# Patient Record
Sex: MALE | Race: WHITE | NOT HISPANIC OR LATINO
[De-identification: names, ages, dates, MRNs, and addresses within clinical notes are randomized per-mention and may not be internally consistent; named-entity substitution may affect disease eponyms.]

---

## 2017-01-19 ENCOUNTER — APPOINTMENT (OUTPATIENT)
Dept: ELECTROPHYSIOLOGY | Facility: CLINIC | Age: 77
End: 2017-01-19

## 2017-04-20 ENCOUNTER — APPOINTMENT (OUTPATIENT)
Dept: ELECTROPHYSIOLOGY | Facility: CLINIC | Age: 77
End: 2017-04-20

## 2017-04-20 ENCOUNTER — APPOINTMENT (OUTPATIENT)
Dept: CARDIOLOGY | Facility: CLINIC | Age: 77
End: 2017-04-20

## 2017-04-20 ENCOUNTER — NON-APPOINTMENT (OUTPATIENT)
Age: 77
End: 2017-04-20

## 2017-04-20 VITALS
HEIGHT: 72 IN | BODY MASS INDEX: 24.24 KG/M2 | DIASTOLIC BLOOD PRESSURE: 64 MMHG | WEIGHT: 179 LBS | SYSTOLIC BLOOD PRESSURE: 99 MMHG | HEART RATE: 49 BPM | OXYGEN SATURATION: 97 %

## 2017-04-21 ENCOUNTER — RESULT REVIEW (OUTPATIENT)
Age: 77
End: 2017-04-21

## 2017-04-21 LAB
ANION GAP SERPL CALC-SCNC: 18 MMOL/L
BASOPHILS # BLD AUTO: 0.08 K/UL
BASOPHILS NFR BLD AUTO: 1.5 %
BUN SERPL-MCNC: 28 MG/DL
CALCIUM SERPL-MCNC: 10 MG/DL
CHLORIDE SERPL-SCNC: 99 MMOL/L
CO2 SERPL-SCNC: 21 MMOL/L
CREAT SERPL-MCNC: 1.68 MG/DL
EOSINOPHIL # BLD AUTO: 0.32 K/UL
EOSINOPHIL NFR BLD AUTO: 5.9 %
GLUCOSE SERPL-MCNC: 65 MG/DL
HCT VFR BLD CALC: 41.3 %
HGB BLD-MCNC: 13.4 G/DL
IMM GRANULOCYTES NFR BLD AUTO: 0.2 %
LYMPHOCYTES # BLD AUTO: 1.13 K/UL
LYMPHOCYTES NFR BLD AUTO: 20.9 %
MAN DIFF?: NORMAL
MCHC RBC-ENTMCNC: 28.7 PG
MCHC RBC-ENTMCNC: 32.4 GM/DL
MCV RBC AUTO: 88.4 FL
MONOCYTES # BLD AUTO: 0.52 K/UL
MONOCYTES NFR BLD AUTO: 9.6 %
NEUTROPHILS # BLD AUTO: 3.35 K/UL
NEUTROPHILS NFR BLD AUTO: 61.9 %
NT-PROBNP SERPL-MCNC: 573 PG/ML
PLATELET # BLD AUTO: 175 K/UL
POTASSIUM SERPL-SCNC: 4.3 MMOL/L
RBC # BLD: 4.67 M/UL
RBC # FLD: 14.5 %
SODIUM SERPL-SCNC: 138 MMOL/L
WBC # FLD AUTO: 5.41 K/UL

## 2017-08-03 ENCOUNTER — APPOINTMENT (OUTPATIENT)
Dept: ELECTROPHYSIOLOGY | Facility: CLINIC | Age: 77
End: 2017-08-03
Payer: MEDICARE

## 2017-08-03 PROCEDURE — 93296 REM INTERROG EVL PM/IDS: CPT

## 2017-08-03 PROCEDURE — 93295 DEV INTERROG REMOTE 1/2/MLT: CPT

## 2017-10-09 ENCOUNTER — OUTPATIENT (OUTPATIENT)
Dept: OUTPATIENT SERVICES | Facility: HOSPITAL | Age: 77
LOS: 1 days | End: 2017-10-09
Payer: MEDICARE

## 2017-10-09 DIAGNOSIS — Z12.11 ENCOUNTER FOR SCREENING FOR MALIGNANT NEOPLASM OF COLON: ICD-10-CM

## 2017-10-09 PROCEDURE — G0121: CPT

## 2017-11-09 ENCOUNTER — NON-APPOINTMENT (OUTPATIENT)
Age: 77
End: 2017-11-09

## 2017-11-09 ENCOUNTER — APPOINTMENT (OUTPATIENT)
Dept: ELECTROPHYSIOLOGY | Facility: CLINIC | Age: 77
End: 2017-11-09
Payer: MEDICARE

## 2017-11-09 ENCOUNTER — APPOINTMENT (OUTPATIENT)
Dept: CARDIOLOGY | Facility: CLINIC | Age: 77
End: 2017-11-09
Payer: MEDICARE

## 2017-11-09 VITALS
DIASTOLIC BLOOD PRESSURE: 73 MMHG | HEART RATE: 52 BPM | BODY MASS INDEX: 23.16 KG/M2 | OXYGEN SATURATION: 99 % | HEIGHT: 72 IN | SYSTOLIC BLOOD PRESSURE: 120 MMHG | WEIGHT: 171 LBS | RESPIRATION RATE: 14 BRPM

## 2017-11-09 PROCEDURE — 99214 OFFICE O/P EST MOD 30 MIN: CPT

## 2017-11-09 PROCEDURE — 93000 ELECTROCARDIOGRAM COMPLETE: CPT

## 2017-11-09 PROCEDURE — 93284 PRGRMG EVAL IMPLANTABLE DFB: CPT

## 2017-11-10 LAB
ANION GAP SERPL CALC-SCNC: 14 MMOL/L
BUN SERPL-MCNC: 33 MG/DL
CALCIUM SERPL-MCNC: 10.4 MG/DL
CHLORIDE SERPL-SCNC: 102 MMOL/L
CO2 SERPL-SCNC: 22 MMOL/L
CREAT SERPL-MCNC: 1.68 MG/DL
GLUCOSE SERPL-MCNC: 74 MG/DL
NT-PROBNP SERPL-MCNC: 702 PG/ML
POTASSIUM SERPL-SCNC: 4.8 MMOL/L
SODIUM SERPL-SCNC: 138 MMOL/L

## 2018-02-15 ENCOUNTER — APPOINTMENT (OUTPATIENT)
Dept: ELECTROPHYSIOLOGY | Facility: CLINIC | Age: 78
End: 2018-02-15

## 2018-05-10 ENCOUNTER — APPOINTMENT (OUTPATIENT)
Dept: ELECTROPHYSIOLOGY | Facility: CLINIC | Age: 78
End: 2018-05-10
Payer: MEDICARE

## 2018-05-10 ENCOUNTER — APPOINTMENT (OUTPATIENT)
Dept: CARDIOLOGY | Facility: CLINIC | Age: 78
End: 2018-05-10
Payer: MEDICARE

## 2018-05-10 VITALS
SYSTOLIC BLOOD PRESSURE: 109 MMHG | HEIGHT: 72 IN | OXYGEN SATURATION: 10 % | HEART RATE: 50 BPM | WEIGHT: 171 LBS | BODY MASS INDEX: 23.16 KG/M2 | DIASTOLIC BLOOD PRESSURE: 69 MMHG

## 2018-05-10 PROCEDURE — 93284 PRGRMG EVAL IMPLANTABLE DFB: CPT

## 2018-05-10 PROCEDURE — 99214 OFFICE O/P EST MOD 30 MIN: CPT

## 2018-05-10 PROCEDURE — 93000 ELECTROCARDIOGRAM COMPLETE: CPT

## 2018-05-10 PROCEDURE — 36415 COLL VENOUS BLD VENIPUNCTURE: CPT

## 2018-05-10 RX ORDER — TADALAFIL 5 MG/1
5 TABLET, FILM COATED ORAL
Qty: 6 | Refills: 0 | Status: ACTIVE | COMMUNITY
Start: 2018-02-19

## 2018-05-11 LAB
ALBUMIN SERPL ELPH-MCNC: 4.3 G/DL
ALP BLD-CCNC: 72 U/L
ALT SERPL-CCNC: 25 U/L
ANION GAP SERPL CALC-SCNC: 16 MMOL/L
AST SERPL-CCNC: 36 U/L
BILIRUB SERPL-MCNC: 0.7 MG/DL
BUN SERPL-MCNC: 29 MG/DL
CALCIUM SERPL-MCNC: 10.1 MG/DL
CHLORIDE SERPL-SCNC: 101 MMOL/L
CO2 SERPL-SCNC: 23 MMOL/L
CREAT SERPL-MCNC: 1.51 MG/DL
GLUCOSE SERPL-MCNC: 52 MG/DL
NT-PROBNP SERPL-MCNC: 591 PG/ML
POTASSIUM SERPL-SCNC: 3.9 MMOL/L
PROT SERPL-MCNC: 7.2 G/DL
SODIUM SERPL-SCNC: 140 MMOL/L

## 2018-07-31 ENCOUNTER — EMERGENCY (EMERGENCY)
Facility: HOSPITAL | Age: 78
LOS: 1 days | Discharge: HOME | End: 2018-07-31
Attending: EMERGENCY MEDICINE
Payer: MEDICARE

## 2018-07-31 VITALS
RESPIRATION RATE: 18 BRPM | HEART RATE: 54 BPM | SYSTOLIC BLOOD PRESSURE: 127 MMHG | OXYGEN SATURATION: 100 % | HEIGHT: 72 IN | DIASTOLIC BLOOD PRESSURE: 65 MMHG | TEMPERATURE: 98 F | WEIGHT: 169.09 LBS

## 2018-07-31 VITALS
OXYGEN SATURATION: 100 % | TEMPERATURE: 98 F | SYSTOLIC BLOOD PRESSURE: 132 MMHG | HEART RATE: 48 BPM | RESPIRATION RATE: 16 BRPM | DIASTOLIC BLOOD PRESSURE: 75 MMHG

## 2018-07-31 PROCEDURE — 70450 CT HEAD/BRAIN W/O DYE: CPT | Mod: 26

## 2018-07-31 PROCEDURE — 99284 EMERGENCY DEPT VISIT MOD MDM: CPT | Mod: GC

## 2018-07-31 PROCEDURE — 70450 CT HEAD/BRAIN W/O DYE: CPT

## 2018-07-31 PROCEDURE — 99284 EMERGENCY DEPT VISIT MOD MDM: CPT | Mod: 25

## 2018-07-31 NOTE — ED SUB INTERN NOTE - OBJECTIVE STATEMENT FT
Pt is a 77 yom, with a past medical history of CVA, CHF, HLD. Presenting with a chief complaint of head pain after having hitting his head on his car door while trying to open it this evening. Pt is on ac-ed coumadin, and became concerned after the incident. Denies any current headache, nausea, vomiting, dizziness, or changes to vision. ROS otherwise unremarkable. Pt is not in any pain currently, comfortable in no acute distress.

## 2018-07-31 NOTE — ED ADULT NURSE NOTE - OBJECTIVE STATEMENT
78y/o Female presented to the ED from home with complaint of head injury. A&Ox3, ambulatory. Patient states that he opened the car door and hit himself in the head with the corner of the car door. On coumadin, Hx stroke, CHF, defibrillator placement 2010. States incident happened around 7:30 tonight. Denies LOC. Patient was seen at urgent care and was told to go to ED for further evaluation. Small abrasion noted over right eyebrow. No bruising /deformity noted. Denies headache, dizziness, N/V/D, chest pain, palpitations. Lung sounds equal/ clear bilaterally. Cap refill < 2 sec. +2 peripheral pulses.

## 2018-07-31 NOTE — ED PROVIDER NOTE - ATTENDING CONTRIBUTION TO CARE
attending Brian: 77yM h/o prior CVA, HLD, CAD on coumadin, presents with headache after minor head trauma on car door. Denies LOC, nausea/vomiting, neck pain, vision changes, confusion, fall. On exam, nonfocal, well-appearing. Will obtain head CT given head trauma on coumadin and reassess

## 2018-07-31 NOTE — ED ADULT TRIAGE NOTE - CHIEF COMPLAINT QUOTE
hit right forehead in a car door 1930 tonight. (+) light redness. no bruise noted. ON coumadin. Denies pain. no loc

## 2018-07-31 NOTE — ED SUB INTERN NOTE - PMH
AICD at End of Battery Life    CAD (Coronary Artery Disease)    Cardiac Aneurysm  Apical  CHF (Congestive Heart Failure)    Coronary Stent  LAD  CVA (Cerebral Infarction)    Ischemic Cardiomyopathy

## 2018-07-31 NOTE — ED PROVIDER NOTE - OBJECTIVE STATEMENT
77 M h/o CVA, HLD, CAD on coumadin, p/w head pain. Tried to open car door earlier this evening and car door hit his head. No headache currently. No LOC. No nausea. No vomiting. No vision change. No neck pain.

## 2018-07-31 NOTE — ED SUB INTERN NOTE - PHYSICAL EXAMINATION
Gen: AO X3, in no acute distress  Head: 1 cm lesion, minimally erythematous, overlying skin intact c/w pt's chief complaint  Eyes: PERRLA  Heart: Normal S1, S2, RRR, No MRG  Lungs: CTA B/L, No RRW  Abd: NT/ND  Neuro: CN 2-12 Grossly Intact  MSK: Full ROM in Upper and Lower Extremities, 5/5 Muscle Strength Upper and Lower Extremities    Assessment:  - R/O Cranial Bleed (Epidural)    Plan:  - CT Head  - Pain Control should patient develop pain

## 2018-07-31 NOTE — ED PROVIDER NOTE - PROGRESS NOTE DETAILS
No e/o acute hemorrhage. patient feeling well with no headache or neuro complaints. Ambulating well. Home with pmd follow up.

## 2018-07-31 NOTE — ED PROVIDER NOTE - MEDICAL DECISION MAKING DETAILS
77 M with head trauma. Low mechanism and no LOC and normal neuro exam. However, given on coumadin will obtain CT head.

## 2018-07-31 NOTE — ED ADULT NURSE NOTE - NSIMPLEMENTINTERV_GEN_ALL_ED
Implemented All Fall with Harm Risk Interventions:  [place] to call system. Call bell, personal items and telephone within reach. Instruct patient to call for assistance. Room bathroom lighting operational. Non-slip footwear when patient is off stretcher. Physically safe environment: no spills, clutter or unnecessary equipment. Stretcher in lowest position, wheels locked, appropriate side rails in place. Provide visual cue, wrist band, yellow gown, etc. Monitor gait and stability. Monitor for mental status changes and reorient to person, place, and time. Review medications for side effects contributing to fall risk. Reinforce activity limits and safety measures with patient and family. Provide visual clues: red socks.

## 2018-08-30 ENCOUNTER — APPOINTMENT (OUTPATIENT)
Dept: ELECTROPHYSIOLOGY | Facility: CLINIC | Age: 78
End: 2018-08-30

## 2018-11-08 ENCOUNTER — OUTPATIENT (OUTPATIENT)
Dept: OUTPATIENT SERVICES | Facility: HOSPITAL | Age: 78
LOS: 1 days | End: 2018-11-08

## 2018-11-08 ENCOUNTER — OTHER (OUTPATIENT)
Age: 78
End: 2018-11-08

## 2018-11-08 ENCOUNTER — APPOINTMENT (OUTPATIENT)
Dept: CV DIAGNOSITCS | Facility: HOSPITAL | Age: 78
End: 2018-11-08
Payer: MEDICARE

## 2018-11-08 ENCOUNTER — APPOINTMENT (OUTPATIENT)
Dept: CARDIOLOGY | Facility: CLINIC | Age: 78
End: 2018-11-08
Payer: MEDICARE

## 2018-11-08 ENCOUNTER — NON-APPOINTMENT (OUTPATIENT)
Age: 78
End: 2018-11-08

## 2018-11-08 ENCOUNTER — APPOINTMENT (OUTPATIENT)
Dept: ELECTROPHYSIOLOGY | Facility: CLINIC | Age: 78
End: 2018-11-08
Payer: MEDICARE

## 2018-11-08 VITALS
SYSTOLIC BLOOD PRESSURE: 118 MMHG | HEART RATE: 53 BPM | HEIGHT: 72 IN | WEIGHT: 176 LBS | BODY MASS INDEX: 23.84 KG/M2 | OXYGEN SATURATION: 100 % | DIASTOLIC BLOOD PRESSURE: 76 MMHG

## 2018-11-08 VITALS — RESPIRATION RATE: 14 BRPM | DIASTOLIC BLOOD PRESSURE: 79 MMHG | HEART RATE: 50 BPM | SYSTOLIC BLOOD PRESSURE: 122 MMHG

## 2018-11-08 DIAGNOSIS — I50.22 CHRONIC SYSTOLIC (CONGESTIVE) HEART FAILURE: ICD-10-CM

## 2018-11-08 PROCEDURE — 93306 TTE W/DOPPLER COMPLETE: CPT | Mod: 26

## 2018-11-08 PROCEDURE — 93000 ELECTROCARDIOGRAM COMPLETE: CPT

## 2018-11-08 PROCEDURE — 93284 PRGRMG EVAL IMPLANTABLE DFB: CPT

## 2018-11-08 PROCEDURE — 99214 OFFICE O/P EST MOD 30 MIN: CPT

## 2018-11-08 NOTE — REASON FOR VISIT
[Follow-Up - Clinic] : a clinic follow-up of [Heart Failure] : congestive heart failure [Spouse] : spouse [FreeTextEntry1] : Cardiomyopathy

## 2018-11-08 NOTE — CARDIOLOGY SUMMARY
[___] : [unfilled] [LVEF ___%] : LVEF [unfilled]% [Severe] : severe LV dysfunction [Normal] : normal LA size [None] : no mitral regurgitation

## 2018-11-08 NOTE — ADDENDUM
[FreeTextEntry1] : Called with lab results from 5/10; Na 140 K 3.9 BUN/creat 29/1.51 and pro BNP decreased to 501. Will mail a copy to pt and will send a copy to primary. Of note, lab glucose error 52.

## 2018-11-08 NOTE — HISTORY OF PRESENT ILLNESS
[FreeTextEntry1] : 78 year old man with history of coronary artery disease, s/p PCI with resultant ischemic cardiomyopathy, HFrEF LVEF 25-30% , S/P BiV ICD 11/26/2010 and CKD.\par \par Comes for a follow up visit today and device check. He is here with his wife. Feels great- no acute SOB, orthopnea, PND, CP, palpitations, dizziness ,syncope or ICd firing. He said he can walk an unlimited distance w/o dyspnea. He walks with his wife over an hour per day. He can climb stairs. He sleeps with one pillow. Pt's B/P today is 118/76 and his ramipril was previously decreased to 2.5 mg daily from 5 mg daily due to low B/P.   Device check and is set at DDD  with good BiV pacing. Of note, pt had a colonoscopy 2017 and reports normal findings. Pt had a TTE 1/7/14 with LVEF 25-30% and repeat TTE today with no significant change with LVEF 26%..

## 2018-11-08 NOTE — DISCUSSION/SUMMARY
[Patient] : the patient [___ Month(s)] : [unfilled] month(s) [FreeTextEntry2] : spouse [FreeTextEntry1] : 1. Chronic systolic HF- NYHA class I symptoms. Appears euvolemic and is normotensive \par - continue ramipril to 2.5 mg daily from 5 mg secondary to prior low B/P episodes\par -Continue with GDMT. Dosages not uptitrated due to hypotension at home but would like to try to increase ramipril to 7.5 mg if B/P stays above 100 mmHg and if creat is < 1.7.\par - TTE today with LVEf 26% with no significant change from 1/17/14\par \par 2. CAD S/P PCI- no active chest pain, continue Coreg at current dose\par \par 3. CKD- creat 5/10/18 was 1.5\par \par -Will fax echo to Jeniffer Cordova [phone] PMD.\par \par Follow up in office in 6 months with device check

## 2018-11-08 NOTE — ASSESSMENT
[FreeTextEntry1] : 78 year old man with history of coronary artery disease, s/p PCI with resultant ischemic cardiomyopathy, HFrEF LVEF 25-30%, S/P BiV ICD and CKD. ACC/AHA Stage C, NYHA Class I\par Appears euvolemic and normotensive

## 2018-11-08 NOTE — REVIEW OF SYSTEMS
[Negative] : Heme/Lymph [see HPI] : see HPI [Recent Weight Gain (___ Lbs)] : recent [unfilled] ~Ulb weight gain [Recent Weight Loss (___ Lbs)] : no recent weight loss

## 2018-11-08 NOTE — PHYSICAL EXAM
[General Appearance - Well Developed] : well developed [Normal Appearance] : normal appearance [Well Groomed] : well groomed [General Appearance - Well Nourished] : well nourished [No Deformities] : no deformities [General Appearance - In No Acute Distress] : no acute distress [Normal Conjunctiva] : the conjunctiva exhibited no abnormalities [Eyelids - No Xanthelasma] : the eyelids demonstrated no xanthelasmas [Normal Oral Mucosa] : normal oral mucosa [No Oral Pallor] : no oral pallor [No Oral Cyanosis] : no oral cyanosis [Normal Jugular Venous A Waves Present] : normal jugular venous A waves present [Normal Jugular Venous V Waves Present] : normal jugular venous V waves present [No Jugular Venous Jackson A Waves] : no jugular venous jackson A waves [Respiration, Rhythm And Depth] : normal respiratory rhythm and effort [Exaggerated Use Of Accessory Muscles For Inspiration] : no accessory muscle use [Auscultation Breath Sounds / Voice Sounds] : lungs were clear to auscultation bilaterally [Heart Rate And Rhythm] : heart rate and rhythm were normal [Heart Sounds] : normal S1 and S2 [Edema] : no peripheral edema present [Bowel Sounds] : normal bowel sounds [Abdomen Soft] : soft [Abdomen Tenderness] : non-tender [Abnormal Walk] : normal gait [Nail Clubbing] : no clubbing of the fingernails [Cyanosis, Localized] : no localized cyanosis [Petechial Hemorrhages (___cm)] : no petechial hemorrhages [Skin Color & Pigmentation] : normal skin color and pigmentation [Skin Turgor] : normal skin turgor [] : no rash [No Venous Stasis] : no venous stasis [Skin Lesions] : no skin lesions [No Skin Ulcers] : no skin ulcer [No Xanthoma] : no  xanthoma was observed [Oriented To Time, Place, And Person] : oriented to person, place, and time [Affect] : the affect was normal [Mood] : the mood was normal [FreeTextEntry1] : Left chest BiV ICD in situ

## 2019-02-07 ENCOUNTER — APPOINTMENT (OUTPATIENT)
Dept: CARDIOLOGY | Facility: CLINIC | Age: 79
End: 2019-02-07
Payer: MEDICARE

## 2019-02-07 ENCOUNTER — NON-APPOINTMENT (OUTPATIENT)
Age: 79
End: 2019-02-07

## 2019-02-07 VITALS
WEIGHT: 177 LBS | DIASTOLIC BLOOD PRESSURE: 74 MMHG | OXYGEN SATURATION: 100 % | RESPIRATION RATE: 14 BRPM | HEIGHT: 72 IN | SYSTOLIC BLOOD PRESSURE: 148 MMHG | BODY MASS INDEX: 23.98 KG/M2 | HEART RATE: 50 BPM

## 2019-02-07 PROCEDURE — 36415 COLL VENOUS BLD VENIPUNCTURE: CPT

## 2019-02-07 PROCEDURE — 99214 OFFICE O/P EST MOD 30 MIN: CPT

## 2019-02-07 PROCEDURE — 93000 ELECTROCARDIOGRAM COMPLETE: CPT

## 2019-02-07 NOTE — ASSESSMENT
[FreeTextEntry1] : 78 year old man with history of coronary artery disease, s/p PCI with resultant ischemic cardiomyopathy, HFrEF LVEF 25-30% to LVEF 26% on 11/8/18 TTE, S/P BiV ICD and CKD. ACC/AHA Stage C, NYHA Class I\par Appears euvolemic and normotensive

## 2019-02-07 NOTE — HISTORY OF PRESENT ILLNESS
[FreeTextEntry1] : 78 year old man with history of coronary artery disease, s/p PCI with resultant ischemic cardiomyopathy, HFrEF LVEF 25-30% , S/P BiV ICD 11/26/2010 and CKD.\par \par Comes for a follow up visit today and was seen with Dr. [place]. He is here with his wife. Feels great- no acute SOB, orthopnea, PND, CP, palpitations, dizziness ,syncope or ICd firing. He said he can walk an unlimited distance w/o dyspnea. He walks with his wife over an hour per day. He can climb stairs. He sleeps with one pillow. His ramipril was previously decreased to 2.5 mg daily from 5 mg daily due to low B/P.   Prior device check and is set at DDD  with good BiV pacing. Of note, pt had a colonoscopy 2017 and reports normal findings. Pt had a TTE 1/7/14 with LVEF 25-30% and repeat TTE 11/8/19 with no significant change with LVEF 26%..

## 2019-02-07 NOTE — DISCUSSION/SUMMARY
[Patient] : the patient [___ Month(s)] : [unfilled] month(s) [FreeTextEntry2] : spouse [FreeTextEntry1] : 1. Chronic systolic HF- NYHA class I symptoms. Appears euvolemic and is normotensive \par - continue ramipril to 2.5 mg daily from 5 mg secondary to prior low B/P episodes\par -Continue with GDMT. Dosages not uptitrated due to hypotension at home but would like to try to increase ramipril to 7.5 mg if B/P stays above 100 mmHg and if creat is < 1.7.\par - TTE 11/8/18 with LVEf 26% with no significant change from 1/17/14\par \par 2. CAD S/P PCI- no active chest pain, continue Coreg at current dose\par \par 3. CKD- creat 5/10/18 was 1.5, will recheck labs today\par \par -Will fax labs to Jeniffer Cordova [phone] PMD.\par \par Follow up in office in 6 months with device check

## 2019-02-07 NOTE — PHYSICAL EXAM
[General Appearance - Well Developed] : well developed [Normal Appearance] : normal appearance [Well Groomed] : well groomed [General Appearance - Well Nourished] : well nourished [No Deformities] : no deformities [General Appearance - In No Acute Distress] : no acute distress [Normal Conjunctiva] : the conjunctiva exhibited no abnormalities [Eyelids - No Xanthelasma] : the eyelids demonstrated no xanthelasmas [Normal Oral Mucosa] : normal oral mucosa [No Oral Pallor] : no oral pallor [No Oral Cyanosis] : no oral cyanosis [Normal Jugular Venous A Waves Present] : normal jugular venous A waves present [Normal Jugular Venous V Waves Present] : normal jugular venous V waves present [No Jugular Venous Jackson A Waves] : no jugular venous jackson A waves [Respiration, Rhythm And Depth] : normal respiratory rhythm and effort [Exaggerated Use Of Accessory Muscles For Inspiration] : no accessory muscle use [Auscultation Breath Sounds / Voice Sounds] : lungs were clear to auscultation bilaterally [Heart Rate And Rhythm] : heart rate and rhythm were normal [Heart Sounds] : normal S1 and S2 [Edema] : no peripheral edema present [Bowel Sounds] : normal bowel sounds [Abdomen Soft] : soft [Abdomen Tenderness] : non-tender [Abnormal Walk] : normal gait [Nail Clubbing] : no clubbing of the fingernails [Cyanosis, Localized] : no localized cyanosis [Petechial Hemorrhages (___cm)] : no petechial hemorrhages [Skin Color & Pigmentation] : normal skin color and pigmentation [Skin Turgor] : normal skin turgor [] : no rash [No Venous Stasis] : no venous stasis [Skin Lesions] : no skin lesions [No Skin Ulcers] : no skin ulcer [No Xanthoma] : no  xanthoma was observed [Oriented To Time, Place, And Person] : oriented to person, place, and time [Affect] : the affect was normal [Mood] : the mood was normal [FreeTextEntry1] : no edema

## 2019-02-08 ENCOUNTER — MEDICATION RENEWAL (OUTPATIENT)
Age: 79
End: 2019-02-08

## 2019-02-08 LAB
ALBUMIN SERPL ELPH-MCNC: 4.4 G/DL
ALP BLD-CCNC: 78 U/L
ALT SERPL-CCNC: 25 U/L
ANION GAP SERPL CALC-SCNC: 9 MMOL/L
AST SERPL-CCNC: 31 U/L
BASOPHILS # BLD AUTO: 0.07 K/UL
BASOPHILS NFR BLD AUTO: 1.1 %
BILIRUB SERPL-MCNC: 0.5 MG/DL
BUN SERPL-MCNC: 23 MG/DL
CALCIUM SERPL-MCNC: 9.9 MG/DL
CHLORIDE SERPL-SCNC: 107 MMOL/L
CHOLEST SERPL-MCNC: 132 MG/DL
CHOLEST/HDLC SERPL: 2.9 RATIO
CO2 SERPL-SCNC: 25 MMOL/L
CREAT SERPL-MCNC: 1.42 MG/DL
EOSINOPHIL # BLD AUTO: 0.29 K/UL
EOSINOPHIL NFR BLD AUTO: 4.6 %
GLUCOSE SERPL-MCNC: 78 MG/DL
HBA1C MFR BLD HPLC: 5.4 %
HCT VFR BLD CALC: 40.5 %
HDLC SERPL-MCNC: 46 MG/DL
HGB BLD-MCNC: 13 G/DL
IMM GRANULOCYTES NFR BLD AUTO: 0.2 %
LDLC SERPL CALC-MCNC: 62 MG/DL
LYMPHOCYTES # BLD AUTO: 1.11 K/UL
LYMPHOCYTES NFR BLD AUTO: 17.6 %
MAN DIFF?: NORMAL
MCHC RBC-ENTMCNC: 28.9 PG
MCHC RBC-ENTMCNC: 32.1 GM/DL
MCV RBC AUTO: 90 FL
MONOCYTES # BLD AUTO: 0.48 K/UL
MONOCYTES NFR BLD AUTO: 7.6 %
NEUTROPHILS # BLD AUTO: 4.35 K/UL
NEUTROPHILS NFR BLD AUTO: 68.9 %
NT-PROBNP SERPL-MCNC: 1106 PG/ML
PLATELET # BLD AUTO: 150 K/UL
POTASSIUM SERPL-SCNC: 5 MMOL/L
PROT SERPL-MCNC: 6.8 G/DL
RBC # BLD: 4.5 M/UL
RBC # FLD: 14.9 %
SODIUM SERPL-SCNC: 141 MMOL/L
TRIGL SERPL-MCNC: 120 MG/DL
TSH SERPL-ACNC: 4.01 UIU/ML
WBC # FLD AUTO: 6.31 K/UL

## 2019-03-12 ENCOUNTER — APPOINTMENT (OUTPATIENT)
Dept: ELECTROPHYSIOLOGY | Facility: CLINIC | Age: 79
End: 2019-03-12
Payer: MEDICARE

## 2019-03-12 PROCEDURE — 93295 DEV INTERROG REMOTE 1/2/MLT: CPT

## 2019-03-12 PROCEDURE — 93296 REM INTERROG EVL PM/IDS: CPT

## 2019-05-23 ENCOUNTER — APPOINTMENT (OUTPATIENT)
Dept: ELECTROPHYSIOLOGY | Facility: CLINIC | Age: 79
End: 2019-05-23
Payer: MEDICARE

## 2019-05-23 ENCOUNTER — APPOINTMENT (OUTPATIENT)
Dept: CARDIOLOGY | Facility: CLINIC | Age: 79
End: 2019-05-23
Payer: MEDICARE

## 2019-05-23 VITALS
RESPIRATION RATE: 14 BRPM | OXYGEN SATURATION: 100 % | WEIGHT: 166 LBS | SYSTOLIC BLOOD PRESSURE: 128 MMHG | DIASTOLIC BLOOD PRESSURE: 74 MMHG | HEIGHT: 72 IN | BODY MASS INDEX: 22.48 KG/M2 | HEART RATE: 54 BPM

## 2019-05-23 VITALS — DIASTOLIC BLOOD PRESSURE: 64 MMHG | RESPIRATION RATE: 14 BRPM | HEART RATE: 50 BPM | SYSTOLIC BLOOD PRESSURE: 107 MMHG

## 2019-05-23 PROCEDURE — 99214 OFFICE O/P EST MOD 30 MIN: CPT

## 2019-05-23 PROCEDURE — 93284 PRGRMG EVAL IMPLANTABLE DFB: CPT

## 2019-05-23 PROCEDURE — 93000 ELECTROCARDIOGRAM COMPLETE: CPT

## 2019-05-23 NOTE — DISCUSSION/SUMMARY
[Patient] : the patient [___ Month(s)] : [unfilled] month(s) [FreeTextEntry2] : spouse [FreeTextEntry1] : 1. Chronic systolic HF- NYHA class I symptoms. Appears euvolemic and is normotensive \par - continue ramipril to 2.5 mg daily from 5 mg secondary to prior low B/P episodes\par -Continue with GDMT. Dosages not uptitrated due to hypotension at home \par - TTE 11/8/18 with LVEf 26% with no significant change from 1/17/14\par \par 2. CAD S/P PCI- no active chest pain, continue Coreg at current dose\par \par 3. CKD- creat 5/10/18 was 1.5, will recheck labs today\par \par -Follow up with Dr. Jeniffer Cordova [phone] PMD.\par \par Follow up in office in 6 months with device check

## 2019-05-23 NOTE — HISTORY OF PRESENT ILLNESS
[FreeTextEntry1] : 78 year old man with history of coronary artery disease, s/p PCI with resultant ischemic cardiomyopathy, HFrEF LVEF 25-30% , S/P BiV ICD 11/26/2010 and CKD.\par \par Comes for a follow up visit today. He is here with his wife who had a bad fall this week resulting in facial lacerations needing stitches and pt feels he very upset by the recent trauma of seeing her fall. He feels well himself with  no acute SOB, orthopnea, PND, CP, palpitations, dizziness ,syncope or ICd firing. He said he can walk an unlimited distance w/o dyspnea. He walks with his wife over an hour per day. He can climb stairs. He sleeps with one pillow. His ramipril was previously decreased to 2.5 mg daily from 5 mg daily due to low B/P.   Prior device check and is set at DDD  with good BiV pacing. Of note, pt had a colonoscopy 2017 and reports normal findings. Pt had a TTE 1/7/14 with LVEF 25-30% and repeat TTE 11/8/19 with no significant change with LVEF 26%..

## 2019-06-12 ENCOUNTER — APPOINTMENT (OUTPATIENT)
Dept: ELECTROPHYSIOLOGY | Facility: CLINIC | Age: 79
End: 2019-06-12

## 2019-08-29 ENCOUNTER — APPOINTMENT (OUTPATIENT)
Dept: CARDIOLOGY | Facility: CLINIC | Age: 79
End: 2019-08-29

## 2019-09-12 ENCOUNTER — APPOINTMENT (OUTPATIENT)
Dept: ELECTROPHYSIOLOGY | Facility: CLINIC | Age: 79
End: 2019-09-12
Payer: MEDICARE

## 2019-09-12 PROCEDURE — 93295 DEV INTERROG REMOTE 1/2/MLT: CPT

## 2019-09-12 PROCEDURE — 93296 REM INTERROG EVL PM/IDS: CPT

## 2019-09-16 ENCOUNTER — APPOINTMENT (OUTPATIENT)
Dept: CARDIOLOGY | Facility: CLINIC | Age: 79
End: 2019-09-16
Payer: MEDICARE

## 2019-09-16 ENCOUNTER — NON-APPOINTMENT (OUTPATIENT)
Age: 79
End: 2019-09-16

## 2019-09-16 VITALS
WEIGHT: 168 LBS | DIASTOLIC BLOOD PRESSURE: 75 MMHG | OXYGEN SATURATION: 100 % | BODY MASS INDEX: 22.75 KG/M2 | HEIGHT: 72 IN | HEART RATE: 50 BPM | SYSTOLIC BLOOD PRESSURE: 125 MMHG

## 2019-09-16 PROCEDURE — 99214 OFFICE O/P EST MOD 30 MIN: CPT

## 2019-09-16 PROCEDURE — 93000 ELECTROCARDIOGRAM COMPLETE: CPT

## 2019-09-16 NOTE — PHYSICAL EXAM
[General Appearance - Well Developed] : well developed [Normal Appearance] : normal appearance [Well Groomed] : well groomed [General Appearance - Well Nourished] : well nourished [General Appearance - In No Acute Distress] : no acute distress [No Deformities] : no deformities [Normal Conjunctiva] : the conjunctiva exhibited no abnormalities [Eyelids - No Xanthelasma] : the eyelids demonstrated no xanthelasmas [Normal Oral Mucosa] : normal oral mucosa [No Oral Pallor] : no oral pallor [No Oral Cyanosis] : no oral cyanosis [Normal Jugular Venous A Waves Present] : normal jugular venous A waves present [Normal Jugular Venous V Waves Present] : normal jugular venous V waves present [No Jugular Venous Jackson A Waves] : no jugular venous jackson A waves [Respiration, Rhythm And Depth] : normal respiratory rhythm and effort [Exaggerated Use Of Accessory Muscles For Inspiration] : no accessory muscle use [Auscultation Breath Sounds / Voice Sounds] : lungs were clear to auscultation bilaterally [Heart Rate And Rhythm] : heart rate and rhythm were normal [Heart Sounds] : normal S1 and S2 [Edema] : no peripheral edema present [Bowel Sounds] : normal bowel sounds [Abdomen Soft] : soft [Abdomen Tenderness] : non-tender [Abnormal Walk] : normal gait [Nail Clubbing] : no clubbing of the fingernails [Cyanosis, Localized] : no localized cyanosis [Petechial Hemorrhages (___cm)] : no petechial hemorrhages [Skin Color & Pigmentation] : normal skin color and pigmentation [] : no rash [Skin Turgor] : normal skin turgor [No Venous Stasis] : no venous stasis [Skin Lesions] : no skin lesions [No Skin Ulcers] : no skin ulcer [No Xanthoma] : no  xanthoma was observed [Oriented To Time, Place, And Person] : oriented to person, place, and time [Affect] : the affect was normal [Mood] : the mood was normal [FreeTextEntry1] : no edema

## 2019-09-16 NOTE — HISTORY OF PRESENT ILLNESS
[FreeTextEntry1] : 78 year old man with history of coronary artery disease, s/p PCI with resultant ischemic cardiomyopathy, HFrEF LVEF 25-30% , S/P BiV ICD 11/26/2010 and CKD.  TTE 1/7/14 with LVEF 25-30% and repeat TTE 11/8/19 with no significant change with LVEF 26%.  Of note, pt had a colonoscopy 2017 and reports normal findings. \par \par Comes for a follow up visit today with his wife. SInce last visit, he has been feeling well. He had an echocardiogram with his cardiologist last week and is waiting for results. He feels well with  no acute SOB, orthopnea, PND, CP, palpitations, dizziness ,syncope or ICD firing. He said he can walk an unlimited distance w/o dyspnea. He walks with his wife over an hour per day. He can climb stairs. He sleeps with one pillow. His ramipril was previously decreased to 2.5 mg daily from 5 mg daily due to low B/P.   9/16/19 device check with settings DDD  with good BiV pacing.

## 2019-09-16 NOTE — DISCUSSION/SUMMARY
[Patient] : the patient [___ Month(s)] : [unfilled] month(s) [FreeTextEntry2] : spouse [FreeTextEntry1] : 1. Chronic systolic HF- NYHA class I symptoms. Appears euvolemic and is normotensive \par - continue ramipril to 2.5 mg daily from 5 mg secondary to prior low B/P episodes\par -Continue with GDMT. Dosages not uptitrated due to prior hypotension at home \par - TTE 11/8/18 with LVEf 26% with no significant change from 1/17/14\par - will obtain recent TTE from Dr. Cordova's office\par \par 2. CAD S/P PCI- no active chest pain\par -  continue Coreg at current dose\par \par 3. CKD- creat 2/8/19 was 1.42\par \par -Follow up with Dr. Jeniffer Cordova [phone] PMD.\par \par Follow up in office in 3-6 months with device check

## 2019-11-18 ENCOUNTER — APPOINTMENT (OUTPATIENT)
Dept: CARDIOLOGY | Facility: CLINIC | Age: 79
End: 2019-11-18
Payer: MEDICARE

## 2019-11-18 ENCOUNTER — NON-APPOINTMENT (OUTPATIENT)
Age: 79
End: 2019-11-18

## 2019-11-18 ENCOUNTER — APPOINTMENT (OUTPATIENT)
Dept: ELECTROPHYSIOLOGY | Facility: CLINIC | Age: 79
End: 2019-11-18
Payer: MEDICARE

## 2019-11-18 VITALS
DIASTOLIC BLOOD PRESSURE: 57 MMHG | HEART RATE: 50 BPM | SYSTOLIC BLOOD PRESSURE: 80 MMHG | OXYGEN SATURATION: 100 % | HEIGHT: 72 IN

## 2019-11-18 VITALS — SYSTOLIC BLOOD PRESSURE: 100 MMHG | DIASTOLIC BLOOD PRESSURE: 62 MMHG

## 2019-11-18 VITALS — WEIGHT: 173 LBS | BODY MASS INDEX: 23.46 KG/M2

## 2019-11-18 DIAGNOSIS — I50.9 HEART FAILURE, UNSPECIFIED: ICD-10-CM

## 2019-11-18 PROCEDURE — 93284 PRGRMG EVAL IMPLANTABLE DFB: CPT

## 2019-11-18 PROCEDURE — 93000 ELECTROCARDIOGRAM COMPLETE: CPT

## 2019-11-18 PROCEDURE — 99214 OFFICE O/P EST MOD 30 MIN: CPT

## 2019-11-18 NOTE — REVIEW OF SYSTEMS
[see HPI] : see HPI [Negative] : Heme/Lymph [Recent Weight Gain (___ Lbs)] : recent [unfilled] ~Ulb weight gain

## 2019-11-18 NOTE — HISTORY OF PRESENT ILLNESS
[FreeTextEntry1] : 78 year old man with history of coronary artery disease, s/p PCI with resultant ischemic cardiomyopathy, HFrEF LVEF 25-30% , S/P BiV ICD 11/26/2010 and CKD.  TTE 1/7/14 with LVEF 25-30% and repeat TTE 11/8/18 with no significant change with LVEF 26% and with Dr. Thorne on 9/14/2019 LVEF 40% with LVEDD 6 cm with moderate LV systolic dysfunction and mild Stage 1 diastolic dysfunction.. Of note, pt had a colonoscopy 2017 and reports normal findings. \par \par Comes for a follow up visit today with his wife. SInce last visit, he has been feeling well. He had an echocardiogram with his cardiologist Dr. Thorne on 9/14/19  with LVEF 40% with LVEDD 6 cm with moderate LV systolic dysfunction and mild Stage 1 diastolic dysfunction.. He feels well with  no acute SOB, orthopnea, PND, CP, palpitations, dizziness ,syncope or ICD firing. He said he can walk an unlimited distance w/o dyspnea. He walks with his wife over an hour per day. He can climb stairs. He sleeps with one pillow. Previously, his ramipril was decreased to 2.5 mg daily from 5 mg daily due to low B/P and initial B/P today after meds was 80/57 with manual recheck 100/62.   Device check today with settings DDD  with good BiV pacing and brief episodes of PAF. EKG today with AV biventricular paced rhythm at 50

## 2019-11-18 NOTE — PHYSICAL EXAM
[General Appearance - Well Developed] : well developed [Normal Appearance] : normal appearance [Well Groomed] : well groomed [General Appearance - Well Nourished] : well nourished [No Deformities] : no deformities [General Appearance - In No Acute Distress] : no acute distress [Normal Conjunctiva] : the conjunctiva exhibited no abnormalities [Normal Oral Mucosa] : normal oral mucosa [Eyelids - No Xanthelasma] : the eyelids demonstrated no xanthelasmas [No Oral Pallor] : no oral pallor [No Oral Cyanosis] : no oral cyanosis [Normal Jugular Venous A Waves Present] : normal jugular venous A waves present [Normal Jugular Venous V Waves Present] : normal jugular venous V waves present [No Jugular Venous Jackson A Waves] : no jugular venous jackson A waves [Respiration, Rhythm And Depth] : normal respiratory rhythm and effort [Auscultation Breath Sounds / Voice Sounds] : lungs were clear to auscultation bilaterally [Exaggerated Use Of Accessory Muscles For Inspiration] : no accessory muscle use [Heart Rate And Rhythm] : heart rate and rhythm were normal [Heart Sounds] : normal S1 and S2 [Edema] : no peripheral edema present [Bowel Sounds] : normal bowel sounds [Abdomen Tenderness] : non-tender [Abdomen Soft] : soft [Nail Clubbing] : no clubbing of the fingernails [Abnormal Walk] : normal gait [Petechial Hemorrhages (___cm)] : no petechial hemorrhages [Cyanosis, Localized] : no localized cyanosis [Skin Color & Pigmentation] : normal skin color and pigmentation [Skin Turgor] : normal skin turgor [] : no rash [No Venous Stasis] : no venous stasis [Skin Lesions] : no skin lesions [No Skin Ulcers] : no skin ulcer [No Xanthoma] : no  xanthoma was observed [Oriented To Time, Place, And Person] : oriented to person, place, and time [Affect] : the affect was normal [Mood] : the mood was normal [FreeTextEntry1] : no edema

## 2019-11-18 NOTE — ASSESSMENT
[FreeTextEntry1] : 78 year old man with history of coronary artery disease, s/p PCI with resultant ischemic cardiomyopathy, HFrEF LVEF 25-30% to LVEF 26% on 11/8/18 TTE, S/P BiV ICD and CKD. ACC/AHA Stage C, NYHA Class I\par Appears euvolemic and hypotensive to low normotensive 80/57 to 100/62

## 2019-11-18 NOTE — CARDIOLOGY SUMMARY
[___] : [unfilled] [Severe] : severe LV dysfunction [LVEF ___%] : LVEF [unfilled]% [Normal] : normal LA size [None] : no mitral regurgitation

## 2019-11-18 NOTE — DISCUSSION/SUMMARY
[Patient] : the patient [___ Month(s)] : [unfilled] month(s) [FreeTextEntry2] : spouse [FreeTextEntry1] : 1. Chronic systolic HF- NYHA class I symptoms. Appears euvolemic but with low B/P, asymptomatic\par - encouraged to increase intake of  fluids to 48-64 oz per day.pt was drinking less than that\par - continue ramipril to 2.5 mg daily from 5 mg secondary to prior low B/P episodes\par -Continue with GDMT. Dosages not uptitrated due to prior hypotension at home \par - TTE 11/8/18 with LVEf 26% with no significant change from 1/17/14 and repeat at Dr. Cordova's 9/14/19 with improved LVEF 40%\par \par 2. CAD S/P PCI- no active chest pain\par -  continue Coreg at current dose\par - pt will call if he has any change in symptoms and will do a NST\par \par 3. CKD- creat 2/8/19 was 1.42\par \par -Follow up with Dr. Jeniffer Cordova [phone] PMD.\par \par Follow up in office in 3-4 months with device check

## 2020-02-18 ENCOUNTER — APPOINTMENT (OUTPATIENT)
Dept: ELECTROPHYSIOLOGY | Facility: CLINIC | Age: 80
End: 2020-02-18
Payer: MEDICARE

## 2020-02-18 PROCEDURE — 93296 REM INTERROG EVL PM/IDS: CPT

## 2020-02-18 PROCEDURE — 93295 DEV INTERROG REMOTE 1/2/MLT: CPT

## 2020-02-24 ENCOUNTER — APPOINTMENT (OUTPATIENT)
Dept: CARDIOLOGY | Facility: CLINIC | Age: 80
End: 2020-02-24
Payer: MEDICARE

## 2020-02-24 VITALS
SYSTOLIC BLOOD PRESSURE: 119 MMHG | HEIGHT: 72 IN | BODY MASS INDEX: 23.57 KG/M2 | OXYGEN SATURATION: 100 % | HEART RATE: 50 BPM | DIASTOLIC BLOOD PRESSURE: 71 MMHG | WEIGHT: 174 LBS

## 2020-02-24 PROCEDURE — 99214 OFFICE O/P EST MOD 30 MIN: CPT

## 2020-02-24 PROCEDURE — 36415 COLL VENOUS BLD VENIPUNCTURE: CPT

## 2020-02-24 PROCEDURE — 93000 ELECTROCARDIOGRAM COMPLETE: CPT

## 2020-02-24 NOTE — ASSESSMENT
[FreeTextEntry1] : 79 year old man with history of coronary artery disease, s/p PCI with resultant ischemic cardiomyopathy, HFrEF LVEF 25-30% , S/P BiV ICD 11/26/2010 and CKD.  TTE 1/7/14 with LVEF 25-30% and repeat TTE 11/8/18 with no significant change with LVEF 26% and with Dr. Cordova on 9/14/2019 LVEF 40% with LVEDD 6 cm with moderate LV systolic dysfunction and mild Stage 1 diastolic dysfunction.\par  ACC/AHA Stage C, NYHA Class I\par Appears euvolemic and normotensive

## 2020-02-24 NOTE — HISTORY OF PRESENT ILLNESS
[FreeTextEntry1] : 79 year old man with history of coronary artery disease, s/p PCI with resultant ischemic cardiomyopathy, HFrEF LVEF 25-30% , S/P BiV ICD 11/26/2010 and CKD.  TTE 1/7/14 with LVEF 25-30% and repeat TTE 11/8/18 with no significant change with LVEF 26% and with Dr. Thorne on 9/14/2019 LVEF 40% with LVEDD 6 cm with moderate LV systolic dysfunction and mild Stage 1 diastolic dysfunction. Of note, pt had a colonoscopy 2017 and reports normal findings. \par \par Comes for a follow up visit today with his wife.  He feels well with  no acute SOB, orthopnea, PND, CP, palpitations, dizziness ,syncope or ICD firing. He said he can walk an unlimited distance w/o dyspnea. He walks with his wife over an hour per day. He can climb stairs. He sleeps with one pillow. Previously, his ramipril was decreased to 2.5 mg daily from 5 mg daily due to low B/P.  EKG today with AV biventricular paced rhythm at 50 bpm.

## 2020-02-24 NOTE — PHYSICAL EXAM
[General Appearance - Well Developed] : well developed [Normal Appearance] : normal appearance [Well Groomed] : well groomed [General Appearance - Well Nourished] : well nourished [No Deformities] : no deformities [General Appearance - In No Acute Distress] : no acute distress [Normal Conjunctiva] : the conjunctiva exhibited no abnormalities [Eyelids - No Xanthelasma] : the eyelids demonstrated no xanthelasmas [Normal Oral Mucosa] : normal oral mucosa [No Oral Pallor] : no oral pallor [No Oral Cyanosis] : no oral cyanosis [Normal Jugular Venous A Waves Present] : normal jugular venous A waves present [Normal Jugular Venous V Waves Present] : normal jugular venous V waves present [No Jugular Venous Jackson A Waves] : no jugular venous jackson A waves [Respiration, Rhythm And Depth] : normal respiratory rhythm and effort [Exaggerated Use Of Accessory Muscles For Inspiration] : no accessory muscle use [Auscultation Breath Sounds / Voice Sounds] : lungs were clear to auscultation bilaterally [Heart Rate And Rhythm] : heart rate and rhythm were normal [Heart Sounds] : normal S1 and S2 [Edema] : no peripheral edema present [Bowel Sounds] : normal bowel sounds [Abdomen Soft] : soft [Abdomen Tenderness] : non-tender [Abnormal Walk] : normal gait [Nail Clubbing] : no clubbing of the fingernails [Cyanosis, Localized] : no localized cyanosis [Petechial Hemorrhages (___cm)] : no petechial hemorrhages [Skin Color & Pigmentation] : normal skin color and pigmentation [Skin Turgor] : normal skin turgor [No Venous Stasis] : no venous stasis [] : no rash [Skin Lesions] : no skin lesions [No Skin Ulcers] : no skin ulcer [No Xanthoma] : no  xanthoma was observed [Oriented To Time, Place, And Person] : oriented to person, place, and time [Affect] : the affect was normal [Mood] : the mood was normal [FreeTextEntry1] : no edema

## 2020-02-24 NOTE — DISCUSSION/SUMMARY
[Patient] : the patient [___ Month(s)] : [unfilled] month(s) [FreeTextEntry2] : spouse [FreeTextEntry1] : 1. Chronic systolic HF- NYHA class I symptoms. Appears euvolemic and normotensive \par - continue ramipril to 2.5 mg daily from 5 mg secondary to prior low B/P episodes\par -Continue with GDMT. Dosages not uptitrated due to prior hypotension at home \par - TTE 11/8/18 with LVEf 26% with no significant change from 1/17/14 and repeat at Dr. Cordova's 9/14/19 with improved LVEF 40%\par \par 2. CAD S/P PCI- no active chest pain\par -  continue Coreg at current dose\par - pt will call if he has any change in symptoms and will do a NST\par \par 3. CKD- creat 2/8/19 was 1.42\par -labs drawn today CMP, CBC, pro BNP, TSH, HgA1C, PSA\par \par -Follow up with Dr. Jeniffer Cordova [phone] PMD.\par \par Follow up in office in 3-4 months with device check

## 2020-02-26 LAB
ALBUMIN SERPL ELPH-MCNC: 4.4 G/DL
ALP BLD-CCNC: 75 U/L
ALT SERPL-CCNC: 22 U/L
ANION GAP SERPL CALC-SCNC: 13 MMOL/L
AST SERPL-CCNC: 25 U/L
BASOPHILS # BLD AUTO: 0.06 K/UL
BASOPHILS NFR BLD AUTO: 1.1 %
BILIRUB SERPL-MCNC: 0.6 MG/DL
BUN SERPL-MCNC: 34 MG/DL
CALCIUM SERPL-MCNC: 9.7 MG/DL
CHLORIDE SERPL-SCNC: 103 MMOL/L
CHOLEST SERPL-MCNC: 139 MG/DL
CHOLEST/HDLC SERPL: 3.1 RATIO
CO2 SERPL-SCNC: 23 MMOL/L
CREAT SERPL-MCNC: 1.65 MG/DL
EOSINOPHIL # BLD AUTO: 0.22 K/UL
EOSINOPHIL NFR BLD AUTO: 4.2 %
ESTIMATED AVERAGE GLUCOSE: 105 MG/DL
GLUCOSE SERPL-MCNC: 68 MG/DL
HBA1C MFR BLD HPLC: 5.3 %
HCT VFR BLD CALC: 39.4 %
HDLC SERPL-MCNC: 45 MG/DL
HGB BLD-MCNC: 12.9 G/DL
IMM GRANULOCYTES NFR BLD AUTO: 0.2 %
LDLC SERPL CALC-MCNC: 67 MG/DL
LYMPHOCYTES # BLD AUTO: 0.99 K/UL
LYMPHOCYTES NFR BLD AUTO: 18.8 %
MAN DIFF?: NORMAL
MCHC RBC-ENTMCNC: 29.8 PG
MCHC RBC-ENTMCNC: 32.7 GM/DL
MCV RBC AUTO: 91 FL
MONOCYTES # BLD AUTO: 0.53 K/UL
MONOCYTES NFR BLD AUTO: 10.1 %
NEUTROPHILS # BLD AUTO: 3.46 K/UL
NEUTROPHILS NFR BLD AUTO: 65.6 %
NT-PROBNP SERPL-MCNC: 650 PG/ML
PLATELET # BLD AUTO: 164 K/UL
POTASSIUM SERPL-SCNC: 4.7 MMOL/L
PROT SERPL-MCNC: 6.7 G/DL
PSA FREE FLD-MCNC: 21 %
PSA FREE SERPL-MCNC: 0.25 NG/ML
PSA SERPL-MCNC: 1.22 NG/ML
RBC # BLD: 4.33 M/UL
RBC # FLD: 13.8 %
SODIUM SERPL-SCNC: 139 MMOL/L
TRIGL SERPL-MCNC: 139 MG/DL
TSH SERPL-ACNC: 4.14 UIU/ML
WBC # FLD AUTO: 5.27 K/UL

## 2020-08-17 ENCOUNTER — NON-APPOINTMENT (OUTPATIENT)
Age: 80
End: 2020-08-17

## 2020-08-17 ENCOUNTER — APPOINTMENT (OUTPATIENT)
Dept: ELECTROPHYSIOLOGY | Facility: CLINIC | Age: 80
End: 2020-08-17
Payer: MEDICARE

## 2020-08-17 ENCOUNTER — APPOINTMENT (OUTPATIENT)
Dept: CARDIOLOGY | Facility: CLINIC | Age: 80
End: 2020-08-17
Payer: MEDICARE

## 2020-08-17 VITALS — BODY MASS INDEX: 22.11 KG/M2 | HEIGHT: 72 IN

## 2020-08-17 VITALS — WEIGHT: 163 LBS | BODY MASS INDEX: 22.11 KG/M2

## 2020-08-17 VITALS — DIASTOLIC BLOOD PRESSURE: 61 MMHG | SYSTOLIC BLOOD PRESSURE: 103 MMHG | HEART RATE: 52 BPM

## 2020-08-17 VITALS — TEMPERATURE: 96.9 F | OXYGEN SATURATION: 100 %

## 2020-08-17 PROCEDURE — 36415 COLL VENOUS BLD VENIPUNCTURE: CPT

## 2020-08-17 PROCEDURE — 99214 OFFICE O/P EST MOD 30 MIN: CPT

## 2020-08-17 PROCEDURE — 93284 PRGRMG EVAL IMPLANTABLE DFB: CPT

## 2020-08-17 PROCEDURE — 93000 ELECTROCARDIOGRAM COMPLETE: CPT

## 2020-08-17 RX ORDER — WARFARIN 5 MG/1
5 TABLET ORAL DAILY
Qty: 90 | Refills: 3 | Status: ACTIVE | COMMUNITY
Start: 2020-05-11 | End: 1900-01-01

## 2020-08-17 NOTE — REVIEW OF SYSTEMS
[see HPI] : see HPI [Negative] : Heme/Lymph [Recent Weight Loss (___ Lbs)] : recent [unfilled] ~Ulb weight loss

## 2020-08-19 LAB
ALBUMIN SERPL ELPH-MCNC: 4.3 G/DL
ALP BLD-CCNC: 73 U/L
ALT SERPL-CCNC: 21 U/L
ANION GAP SERPL CALC-SCNC: 10 MMOL/L
AST SERPL-CCNC: 27 U/L
BASOPHILS # BLD AUTO: 0.06 K/UL
BASOPHILS NFR BLD AUTO: 1.1 %
BILIRUB SERPL-MCNC: 0.4 MG/DL
BUN SERPL-MCNC: 38 MG/DL
CALCIUM SERPL-MCNC: 9.1 MG/DL
CHLORIDE SERPL-SCNC: 107 MMOL/L
CHOLEST SERPL-MCNC: 113 MG/DL
CHOLEST/HDLC SERPL: 2.8 RATIO
CO2 SERPL-SCNC: 24 MMOL/L
CREAT SERPL-MCNC: 1.78 MG/DL
EOSINOPHIL # BLD AUTO: 0.25 K/UL
EOSINOPHIL NFR BLD AUTO: 4.7 %
ESTIMATED AVERAGE GLUCOSE: 103 MG/DL
FERRITIN SERPL-MCNC: 205 NG/ML
FOLATE SERPL-MCNC: >20 NG/ML
GLUCOSE SERPL-MCNC: 55 MG/DL
HBA1C MFR BLD HPLC: 5.2 %
HCT VFR BLD CALC: 37.3 %
HCYS SERPL-MCNC: 18.4 UMOL/L
HDLC SERPL-MCNC: 41 MG/DL
HGB BLD-MCNC: 11.9 G/DL
IMM GRANULOCYTES NFR BLD AUTO: 0.2 %
IRON SATN MFR SERPL: 27 %
IRON SERPL-MCNC: 73 UG/DL
LDLC SERPL CALC-MCNC: 55 MG/DL
LYMPHOCYTES # BLD AUTO: 0.98 K/UL
LYMPHOCYTES NFR BLD AUTO: 18.5 %
MAN DIFF?: NORMAL
MCHC RBC-ENTMCNC: 29.5 PG
MCHC RBC-ENTMCNC: 31.9 GM/DL
MCV RBC AUTO: 92.6 FL
MONOCYTES # BLD AUTO: 0.45 K/UL
MONOCYTES NFR BLD AUTO: 8.5 %
NEUTROPHILS # BLD AUTO: 3.55 K/UL
NEUTROPHILS NFR BLD AUTO: 67 %
NT-PROBNP SERPL-MCNC: 657 PG/ML
PLATELET # BLD AUTO: 137 K/UL
POTASSIUM SERPL-SCNC: 4.4 MMOL/L
PROT SERPL-MCNC: 6.9 G/DL
RBC # BLD: 4.03 M/UL
RBC # BLD: 4.03 M/UL
RBC # FLD: 14.9 %
RETICS # AUTO: 1 %
RETICS AGGREG/RBC NFR: 39.9 K/UL
SODIUM SERPL-SCNC: 140 MMOL/L
TIBC SERPL-MCNC: 271 UG/DL
TRIGL SERPL-MCNC: 88 MG/DL
TSH SERPL-ACNC: 4.52 UIU/ML
UIBC SERPL-MCNC: 198 UG/DL
VIT B12 SERPL-MCNC: 1661 PG/ML
WBC # FLD AUTO: 5.3 K/UL

## 2020-08-19 NOTE — PHYSICAL EXAM
[Normal Appearance] : normal appearance [General Appearance - Well Developed] : well developed [Well Groomed] : well groomed [General Appearance - Well Nourished] : well nourished [No Deformities] : no deformities [General Appearance - In No Acute Distress] : no acute distress [Normal Conjunctiva] : the conjunctiva exhibited no abnormalities [Eyelids - No Xanthelasma] : the eyelids demonstrated no xanthelasmas [No Oral Pallor] : no oral pallor [Normal Oral Mucosa] : normal oral mucosa [No Oral Cyanosis] : no oral cyanosis [Normal Jugular Venous A Waves Present] : normal jugular venous A waves present [No Jugular Venous Jackson A Waves] : no jugular venous jackson A waves [Normal Jugular Venous V Waves Present] : normal jugular venous V waves present [Respiration, Rhythm And Depth] : normal respiratory rhythm and effort [Exaggerated Use Of Accessory Muscles For Inspiration] : no accessory muscle use [Auscultation Breath Sounds / Voice Sounds] : lungs were clear to auscultation bilaterally [Heart Rate And Rhythm] : heart rate and rhythm were normal [Heart Sounds] : normal S1 and S2 [Edema] : no peripheral edema present [Bowel Sounds] : normal bowel sounds [Abdomen Soft] : soft [Abdomen Tenderness] : non-tender [Nail Clubbing] : no clubbing of the fingernails [Abnormal Walk] : normal gait [Cyanosis, Localized] : no localized cyanosis [Petechial Hemorrhages (___cm)] : no petechial hemorrhages [Skin Color & Pigmentation] : normal skin color and pigmentation [Skin Turgor] : normal skin turgor [] : no rash [No Venous Stasis] : no venous stasis [Skin Lesions] : no skin lesions [No Skin Ulcers] : no skin ulcer [No Xanthoma] : no  xanthoma was observed [Affect] : the affect was normal [Oriented To Time, Place, And Person] : oriented to person, place, and time [Mood] : the mood was normal [FreeTextEntry1] : no edema

## 2020-08-19 NOTE — DISCUSSION/SUMMARY
[Patient] : the patient [___ Month(s)] : [unfilled] month(s) [FreeTextEntry1] : 1. Chronic systolic HF- NYHA class I symptoms. Appears euvolemic and normotensive \par - continue ramipril to 2.5 mg daily from 5 mg secondary to prior low B/P episodes\par -Continue with GDMT. Dosages not uptitrated due to prior hypotension at home \par - TTE 11/8/18 with LVEf 26% with no significant change from 1/17/14 and repeat at Dr. Cordova's 9/14/19 with improved LVEF 40%\par \par 2. CAD S/P PCI- no active chest pain\par -  continue Coreg at current dose\par - pt will call if he has any change in symptoms and will do a NST\par \par 3. CKD- creat 2/8/19 was 1.42\par -labs drawn today and will call with results and send to PCP and hematologist\par \par -Follow up with Dr. Nawaf Root who is taking the place of Dr. Jeniffer Cordova who is retiring [phone] and hematologist Dr. Pineda [phone].\par \par Follow up in office in 3-4 months with device check [FreeTextEntry2] : spouse

## 2020-08-19 NOTE — REASON FOR VISIT
[Follow-Up - Clinic] : a clinic follow-up of [Heart Failure] : congestive heart failure [FreeTextEntry1] : Cardiomyopathy

## 2020-08-19 NOTE — HISTORY OF PRESENT ILLNESS
[FreeTextEntry1] : 79 year old man with history of coronary artery disease, s/p PCI with resultant ischemic cardiomyopathy, HFrEF LVEF 25-30% , S/P BiV ICD 11/26/2010 and CKD.  TTE 1/7/14 with LVEF 25-30% and repeat TTE 11/8/18 with no significant change with LVEF 26% and with Dr. Thorne on 9/14/2019 LVEF 40% with LVEDD 6 cm with moderate LV systolic dysfunction and mild Stage 1 diastolic dysfunction. Of note, pt had a colonoscopy 2017 and reports normal findings. Pt is here for a follow up today.\par \par Comes for a follow up visit today and device check with normal function with biventricular paced rhythm and battery remaining 2.5 years.. This is the first doctor's appt he has had out since Covid 19 pandemic and has been staying at home. He lost approx 5 lbs since last visit with reducing calories but has a good appetite.  He feels well with  no acute SOB, orthopnea, PND, CP, palpitations, dizziness ,syncope or ICD firing. He said he can walk an unlimited distance w/o dyspnea. He walks with his wife for several blocks daily.. He can climb stairs. He sleeps with one pillow. Previously, his ramipril was decreased to 2.5 mg daily from 5 mg daily due to low B/P.  EKG today with AV biventricular paced rhythm at 50 bpm.  Of note, he needs labs drawn for hematology Dr. Pineda and will have them done here today. Home INR 2.1 on warfarin 7 mg alt with 5 mg. His PCP Dr. Cordova is retiring and Dr. Nawaf Root will be taking over as his PCP.

## 2020-11-19 ENCOUNTER — APPOINTMENT (OUTPATIENT)
Dept: ELECTROPHYSIOLOGY | Facility: CLINIC | Age: 80
End: 2020-11-19
Payer: MEDICARE

## 2020-11-19 PROCEDURE — 93295 DEV INTERROG REMOTE 1/2/MLT: CPT

## 2020-11-19 PROCEDURE — 93296 REM INTERROG EVL PM/IDS: CPT

## 2020-12-09 ENCOUNTER — APPOINTMENT (OUTPATIENT)
Dept: CARDIOLOGY | Facility: CLINIC | Age: 80
End: 2020-12-09

## 2021-02-17 ENCOUNTER — APPOINTMENT (OUTPATIENT)
Dept: ELECTROPHYSIOLOGY | Facility: CLINIC | Age: 81
End: 2021-02-17

## 2021-02-18 ENCOUNTER — APPOINTMENT (OUTPATIENT)
Dept: ELECTROPHYSIOLOGY | Facility: CLINIC | Age: 81
End: 2021-02-18
Payer: MEDICARE

## 2021-02-18 ENCOUNTER — NON-APPOINTMENT (OUTPATIENT)
Age: 81
End: 2021-02-18

## 2021-02-18 PROCEDURE — 93296 REM INTERROG EVL PM/IDS: CPT

## 2021-02-18 PROCEDURE — 93295 DEV INTERROG REMOTE 1/2/MLT: CPT

## 2021-02-22 ENCOUNTER — APPOINTMENT (OUTPATIENT)
Dept: CARDIOLOGY | Facility: CLINIC | Age: 81
End: 2021-02-22
Payer: MEDICARE

## 2021-02-22 ENCOUNTER — NON-APPOINTMENT (OUTPATIENT)
Age: 81
End: 2021-02-22

## 2021-02-22 ENCOUNTER — APPOINTMENT (OUTPATIENT)
Dept: ELECTROPHYSIOLOGY | Facility: CLINIC | Age: 81
End: 2021-02-22
Payer: MEDICARE

## 2021-02-22 VITALS
BODY MASS INDEX: 22.24 KG/M2 | OXYGEN SATURATION: 100 % | TEMPERATURE: 97.6 F | HEART RATE: 50 BPM | HEIGHT: 72 IN | SYSTOLIC BLOOD PRESSURE: 113 MMHG | DIASTOLIC BLOOD PRESSURE: 70 MMHG

## 2021-02-22 VITALS — BODY MASS INDEX: 22.24 KG/M2 | WEIGHT: 164 LBS

## 2021-02-22 PROCEDURE — 93284 PRGRMG EVAL IMPLANTABLE DFB: CPT

## 2021-02-22 PROCEDURE — 99214 OFFICE O/P EST MOD 30 MIN: CPT

## 2021-02-22 PROCEDURE — 93000 ELECTROCARDIOGRAM COMPLETE: CPT

## 2021-02-22 NOTE — PHYSICAL EXAM
[General Appearance - Well Developed] : well developed [Normal Appearance] : normal appearance [Well Groomed] : well groomed [General Appearance - Well Nourished] : well nourished [No Deformities] : no deformities [General Appearance - In No Acute Distress] : no acute distress [Normal Conjunctiva] : the conjunctiva exhibited no abnormalities [Eyelids - No Xanthelasma] : the eyelids demonstrated no xanthelasmas [Normal Oral Mucosa] : normal oral mucosa [No Oral Pallor] : no oral pallor [No Oral Cyanosis] : no oral cyanosis [Normal Jugular Venous A Waves Present] : normal jugular venous A waves present [Normal Jugular Venous V Waves Present] : normal jugular venous V waves present [No Jugular Venous Jackson A Waves] : no jugular venous jacksno A waves [Respiration, Rhythm And Depth] : normal respiratory rhythm and effort [Exaggerated Use Of Accessory Muscles For Inspiration] : no accessory muscle use [Auscultation Breath Sounds / Voice Sounds] : lungs were clear to auscultation bilaterally [Heart Rate And Rhythm] : heart rate and rhythm were normal [Heart Sounds] : normal S1 and S2 [Edema] : no peripheral edema present [Bowel Sounds] : normal bowel sounds [Abdomen Soft] : soft [Abdomen Tenderness] : non-tender [Nail Clubbing] : no clubbing of the fingernails [Abnormal Walk] : normal gait [Cyanosis, Localized] : no localized cyanosis [Petechial Hemorrhages (___cm)] : no petechial hemorrhages [Skin Color & Pigmentation] : normal skin color and pigmentation [] : no rash [Skin Turgor] : normal skin turgor [No Venous Stasis] : no venous stasis [Skin Lesions] : no skin lesions [No Skin Ulcers] : no skin ulcer [No Xanthoma] : no  xanthoma was observed [Affect] : the affect was normal [Oriented To Time, Place, And Person] : oriented to person, place, and time [Mood] : the mood was normal [FreeTextEntry1] : no edema

## 2021-02-22 NOTE — HISTORY OF PRESENT ILLNESS
[FreeTextEntry1] : 80 year old man with history of coronary artery disease, s/p PCI with resultant ischemic cardiomyopathy, HFrEF LVEF 25-30% , S/P BiV ICD 11/26/2010 and CKD.  TTE 1/7/14 with LVEF 25-30% and repeat TTE 11/8/18 with no significant change with LVEF 26% and with Dr. Thorne on 9/14/2019 LVEF 40% with LVEDD 6 cm with moderate LV systolic dysfunction and mild Stage 1 diastolic dysfunction. Of note, pt had a colonoscopy 2017 and reports normal findings. Pt is here for a follow up today.\par \par Comes for a follow up visit today and device check with normal function with biventricular paced rhythm and battery remaining 2 years. His home weight has been stable in the range of 164-165 lbs.  He feels well with  no acute SOB, orthopnea, PND, CP, palpitations, dizziness ,syncope or ICD firing. He said he can walk an unlimited distance w/o dyspnea. He walks with his wife for several blocks daily when the weather is nice but have been home recently due to the cold weather. He can climb stairs without dyspnea. He sleeps with one pillow with no orthopnea. He remembers hurting his left shoulder 2/9 while in the shower reaching backward and has limited ROM since.   EKG today with AV biventricular paced rhythm at 50 bpm.  Of note, he had labs done with PCP Dr. Camacho ( [employer]) with H&H 12.1/39.2, Na 150, K 5.5, , CO2 25.3, normal LFT, BUN 32/creat 1.6, chol 114, tri 105, HDL 45 and LDL 48.  Pt admits to drinking moni juice daily and K is 5.5.

## 2021-02-22 NOTE — ASSESSMENT
[FreeTextEntry1] : 80 year old man with history of coronary artery disease, s/p PCI with resultant ischemic cardiomyopathy, HFrEF LVEF 25-30% , S/P BiV ICD 11/26/2010 and CKD.  TTE 1/7/14 with LVEF 25-30% and repeat TTE 11/8/18 with no significant change with LVEF 26% and with Dr. Cordova on 9/14/2019 LVEF 40% with LVEDD 6 cm with moderate LV systolic dysfunction and mild Stage 1 diastolic dysfunction.\par  ACC/AHA Stage C, NYHA Class II\par Appears euvolemic and normotensive

## 2021-02-22 NOTE — REVIEW OF SYSTEMS
[see HPI] : see HPI [Negative] : Heme/Lymph [FreeTextEntry1] : Decreased ROM of LUE due to shoulder injury

## 2021-02-22 NOTE — DISCUSSION/SUMMARY
[Patient] : the patient [___ Month(s)] : [unfilled] month(s) [FreeTextEntry1] : 1. Chronic systolic HF- NYHA class I symptoms. Appears euvolemic and normotensive \par - continue ramipril to 2.5 mg daily ( previously 5 mg secondary but decreased due to low B/P episodes)\par -Continue with GDMT. Dosages not uptitrated due to prior hypotension at home \par - TTE 11/8/18 with LVEf 26% with no significant change from 1/17/14 and repeat at Dr. Cordova's (now PCP Dr. Camacho)  9/14/19 with improved LVEF 40%\par \par 2. CAD S/P PCI- no active chest pain\par -  continue Coreg at current dose\par - pt will call if he has any change in symptoms and will do a NST\par \par 3. CKD- creat 2/8/19 was 1.42 to 1.6 on labs 1/20/21\par -K 5.5, will reduce drinking moni juice to 5 days a week from daily and will repeat labs\par \par -Follow up with Dr. Nawaf Root who is took the place of Dr. Jeniffer Cordova who retired  [phone] and hematologist Dr. Pineda [phone].\par \par Follow up in office in 3-4 months with device check

## 2021-05-20 ENCOUNTER — NON-APPOINTMENT (OUTPATIENT)
Age: 81
End: 2021-05-20

## 2021-05-20 ENCOUNTER — APPOINTMENT (OUTPATIENT)
Dept: ELECTROPHYSIOLOGY | Facility: CLINIC | Age: 81
End: 2021-05-20
Payer: MEDICARE

## 2021-05-20 PROCEDURE — 93296 REM INTERROG EVL PM/IDS: CPT

## 2021-05-20 PROCEDURE — 93295 DEV INTERROG REMOTE 1/2/MLT: CPT

## 2021-05-21 ENCOUNTER — APPOINTMENT (OUTPATIENT)
Dept: ELECTROPHYSIOLOGY | Facility: CLINIC | Age: 81
End: 2021-05-21

## 2021-08-19 ENCOUNTER — NON-APPOINTMENT (OUTPATIENT)
Age: 81
End: 2021-08-19

## 2021-08-19 ENCOUNTER — APPOINTMENT (OUTPATIENT)
Dept: ELECTROPHYSIOLOGY | Facility: CLINIC | Age: 81
End: 2021-08-19
Payer: MEDICARE

## 2021-08-19 PROCEDURE — 93295 DEV INTERROG REMOTE 1/2/MLT: CPT

## 2021-08-19 PROCEDURE — 93296 REM INTERROG EVL PM/IDS: CPT

## 2021-08-23 ENCOUNTER — APPOINTMENT (OUTPATIENT)
Dept: CARDIOLOGY | Facility: CLINIC | Age: 81
End: 2021-08-23
Payer: MEDICARE

## 2021-08-23 ENCOUNTER — APPOINTMENT (OUTPATIENT)
Dept: ELECTROPHYSIOLOGY | Facility: CLINIC | Age: 81
End: 2021-08-23
Payer: MEDICARE

## 2021-08-23 ENCOUNTER — NON-APPOINTMENT (OUTPATIENT)
Age: 81
End: 2021-08-23

## 2021-08-23 VITALS — HEART RATE: 51 BPM | DIASTOLIC BLOOD PRESSURE: 67 MMHG | SYSTOLIC BLOOD PRESSURE: 108 MMHG

## 2021-08-23 VITALS — OXYGEN SATURATION: 100 % | HEIGHT: 72 IN | WEIGHT: 167 LBS | BODY MASS INDEX: 22.62 KG/M2

## 2021-08-23 PROCEDURE — 93284 PRGRMG EVAL IMPLANTABLE DFB: CPT

## 2021-08-23 PROCEDURE — 99214 OFFICE O/P EST MOD 30 MIN: CPT

## 2021-08-23 PROCEDURE — 93000 ELECTROCARDIOGRAM COMPLETE: CPT

## 2021-08-23 RX ORDER — WARFARIN 1 MG/1
1 TABLET ORAL
Qty: 30 | Refills: 0 | Status: ACTIVE | COMMUNITY
Start: 2020-07-01

## 2021-08-23 NOTE — DISCUSSION/SUMMARY
[FreeTextEntry1] : 1. Chronic systolic HF- NYHA class I symptoms. Appears euvolemic and normotensive \par - continue ramipril to 2.5 mg daily ( previously 5 mg secondary but decreased due to low B/P episodes)\par -Continue with GDMT. Dosages not uptitrated due to prior hypotension at home \par - TTE 11/8/18 with LVEf 26% with no significant change from 1/17/14 and repeat at Dr. Cordova's (now PCP Dr. Camacho)  9/14/19 with improved LVEF 40% and  repeat 8/18/21 with LVEF 30-35%,  severe LV systolic dysfunction, mild MR\par \par 2. CAD S/P PCI- no active chest pain\par -  continue Coreg at current dose of 12.5 mg bid, will not increase HR 50 bpm\par - pt will call if he has any change in symptoms and will do a NST\par \par 3. CKD- creat 2/8/19 was 1.42 to 1.6 on labs 1/20/21\par -will obtain recent labs from Dr. Root  who is took the place of Dr. Jeniffer Cordova who retired  [phone] and hematologist Dr. Pineda [phone].\par \par 4. Hx stroke syndrome\par - pt is on coumadin with INR in therapeutic range ( usually takes 6 mg daily) \par \par \par Follow up in office in 6 months with device check with 1 year battery remaining

## 2021-08-23 NOTE — REASON FOR VISIT
[Follow-Up - Clinic] : a clinic follow-up of [Heart Failure] : congestive heart failure [Cardiac Failure] : cardiac failure [FreeTextEntry1] : Cardiomyopathy

## 2021-08-23 NOTE — CARDIOLOGY SUMMARY
[___] : [unfilled] [LVEF ___%] : LVEF [unfilled]% [Severe] : severe LV dysfunction [Normal] : normal LA size [None] : no mitral regurgitation [de-identified] : 8/18/221 TTE ( Dr. Root's office); LVEF 30-35%, LVIDD 6.1 cm, severe segmental LV systolic dysfunction with anteroseptal, distal anterior, apical, inferior and distal interoseptal hypokinesis, trace MR\par

## 2021-08-23 NOTE — HISTORY OF PRESENT ILLNESS
[FreeTextEntry1] : 80 year old man with history of coronary artery disease, s/p PCI with resultant ischemic cardiomyopathy, HFrEF LVEF 25-30% , S/P BiV ICD 11/26/2010 and CKD.  TTE 1/7/14 with LVEF 25-30% and repeat TTE 11/8/18 with no significant change with LVEF 26% and with Dr. Thorne on 9/14/2019 LVEF 40% with LVEDD 6 cm with moderate LV systolic dysfunction and mild Stage 1 diastolic dysfunction and repeat 8/18/21 with LVEF 30-35%,  severe LV systolic dysfunction.   . Of note, pt had a colonoscopy 2017 and reports normal findings. Pt is here for a follow up today.\par \par Comes for a follow up visit today. Since last visit,he had a TTE 8/18/21 with LVEF 30-35%, LVIDD 6.1 cm, severe LV systolic dysfunction.  He had labs done with PCP that will be sent to the office.\par Device check with normal function with biventricular paced rhythm and battery remaining 1 year. His home weight has been stable in the range of 164-165 lbs.  He feels well with  no acute SOB, orthopnea, PND, CP, palpitations, dizziness ,syncope or ICD firing. He said he can walk an unlimited distance w/o dyspnea. He walks with his wife for several blocks daily when the weather is nice. He can climb stairs without dyspnea. He sleeps with one pillow with no orthopnea. EKG today with AV biventricular paced rhythm at 50 bpm.  Of note, he had labs done with PCP Dr. Camacho ( [employer]) His brother passed from [employer] in January 2021.\par \par Pt had Covid 19 vaccine x 2 without issue.

## 2021-10-13 NOTE — ED ADULT NURSE NOTE - DISCHARGE DATE/TIME

## 2021-11-18 ENCOUNTER — APPOINTMENT (OUTPATIENT)
Dept: ELECTROPHYSIOLOGY | Facility: CLINIC | Age: 81
End: 2021-11-18
Payer: MEDICARE

## 2021-11-18 ENCOUNTER — NON-APPOINTMENT (OUTPATIENT)
Age: 81
End: 2021-11-18

## 2021-11-18 PROCEDURE — 93296 REM INTERROG EVL PM/IDS: CPT | Mod: NC

## 2021-11-18 PROCEDURE — 93295 DEV INTERROG REMOTE 1/2/MLT: CPT

## 2022-02-24 ENCOUNTER — APPOINTMENT (OUTPATIENT)
Dept: ELECTROPHYSIOLOGY | Facility: CLINIC | Age: 82
End: 2022-02-24
Payer: MEDICARE

## 2022-02-24 ENCOUNTER — APPOINTMENT (OUTPATIENT)
Dept: CARDIOLOGY | Facility: CLINIC | Age: 82
End: 2022-02-24
Payer: MEDICARE

## 2022-02-24 ENCOUNTER — NON-APPOINTMENT (OUTPATIENT)
Age: 82
End: 2022-02-24

## 2022-02-24 ENCOUNTER — APPOINTMENT (OUTPATIENT)
Dept: CARDIOLOGY | Facility: CLINIC | Age: 82
End: 2022-02-24

## 2022-02-24 VITALS
SYSTOLIC BLOOD PRESSURE: 125 MMHG | HEART RATE: 50 BPM | DIASTOLIC BLOOD PRESSURE: 75 MMHG | BODY MASS INDEX: 23.34 KG/M2 | HEIGHT: 70 IN | WEIGHT: 163 LBS | OXYGEN SATURATION: 100 % | TEMPERATURE: 79.4 F

## 2022-02-24 VITALS — SYSTOLIC BLOOD PRESSURE: 113 MMHG | DIASTOLIC BLOOD PRESSURE: 56 MMHG | RESPIRATION RATE: 14 BRPM | HEART RATE: 57 BPM

## 2022-02-24 PROCEDURE — 99214 OFFICE O/P EST MOD 30 MIN: CPT

## 2022-02-24 PROCEDURE — 93284 PRGRMG EVAL IMPLANTABLE DFB: CPT

## 2022-02-24 PROCEDURE — 93000 ELECTROCARDIOGRAM COMPLETE: CPT

## 2022-02-24 NOTE — ASSESSMENT
[FreeTextEntry1] : 81 year old man with history of coronary artery disease, s/p PCI with resultant ischemic cardiomyopathy, HFrEF LVEF 25-30% , S/P BiV ICD 11/26/2010 and CKD. Last TTE 8/18/21 with LVEF 30-35%,  severe LV systolic dysfunction.  Of note, pt had a colonoscopy 2017 and reports normal findings\par \par  ACC/AHA Stage C, NYHA Class II\par Appears euvolemic and normotensive

## 2022-02-24 NOTE — CARDIOLOGY SUMMARY
[de-identified] : 2/24/22 Atrial paced 50 with biventricular paced rhythm, NSST\par 2/22/21 Atrial paced 50 with biventricular paced rhythm, NSST\par 8/17/20 Atrial paced with biventricular paced rhythm rate 50, NSST\par  [de-identified] : TTE;  8/18/21 with LVEF 30-35%, \par \par TTE with  Dr. Thorne on 9/14/2019 LVEF 40% with LVEDD 6 cm with moderate LV systolic dysfunction and mild Stage 1 diastolic dysfunction \par \par TTE 11/8/18 with no significant change with LVEF 26% normal LA, severe segmental LV systolic dysfunction, no LV thrombus, normal RV systolic function, mild to mod TR. No significant change from 1/17/14\par \par TTE 1/7/14 LVEF 25-30% severe LV dysfunction, no pulmonary hypertension, normal LA size, no mitral regurgitation \par

## 2022-02-24 NOTE — PHYSICAL EXAM
[Well Developed] : well developed [No Acute Distress] : no acute distress [Normal Conjunctiva] : normal conjunctiva [Normal S1, S2] : normal S1, S2 [Clear Lung Fields] : clear lung fields [Good Air Entry] : good air entry [Soft] : abdomen soft [Non Tender] : non-tender [No Rash] : no rash [Normal] : alert and oriented, normal memory [de-identified] : Elderly male  [de-identified] : JVP 6-8 cm  [de-identified] : kristian rate 50's, Left chest [employer] CRT-D

## 2022-02-24 NOTE — DISCUSSION/SUMMARY
[Patient] : the patient [___ Month(s)] : in [unfilled] month(s) [FreeTextEntry1] : 1. Chronic systolic HF- NYHA class I symptoms. Appears euvolemic and normotensive \par - continue ramipril to 2.5 mg daily ( previously 5 mg secondary but decreased due to low B/P episodes)\par -Continue with GDMT. Dosages not uptitrated due to prior hypotension at home \par - TTE 11/8/18 with LVEf 26% with no significant change from 1/17/14 and repeat at Dr. Cordova's (now PCP Dr. Camacho)  9/14/19 with improved LVEF 40% and  repeat 8/18/21 with LVEF 30-35%,  severe LV systolic dysfunction, mild MR\par - limit salt to less than 2000 mg per day\par - limit fluid to less than 2 liters per day\par - continue daily exercise\par \par 2. CAD S/P PCI- no active chest pain\par -  continue Coreg at current dose of 12.5 mg bid, will not increase HR 50 bpm\par - pt will call if he has any change in symptoms and will do a NST\par \par 3. CKD- creat 2/8/19 was 1.42 to 1.6 on labs 8/2021\par -will obtain recent labs from Dr. Root  who is took the place of Dr. Jeniffer Cordova who retired  [phone] and hematologist Dr. Pineda [phone].\par \par 4. Hx stroke syndrome\par - pt is on Coumadin with INR in therapeutic range ( usually takes 6 mg daily alternating with 5 mg) INR 2.7\par \par \par Follow up in office in 6 months with device check with 1 year battery remaining

## 2022-02-24 NOTE — HISTORY OF PRESENT ILLNESS
[FreeTextEntry1] : 81 year old man with history of coronary artery disease, s/p PCI with resultant ischemic cardiomyopathy, HFrEF LVEF 25-30% , S/P BiV ICD 11/26/2010 and CKD. Last TTE 8/18/21 with LVEF 30-35%,  severe LV systolic dysfunction.  Of note, pt had a colonoscopy 2017 and reports normal findings. Pt is here for a follow up today.\par \par Comes for a follow up visit today. Since last visit 8/23/21 he has been doing well.He had labs done with PCP 8/2021 with mild anemia Hemoglobin 11 with K 4.3 and creat 1.6. \par Device check with normal function with biventricular paced rhythm and battery remaining 1 year. His home weight has been stable in the range of 163-165 lbs.  He feels well with  no acute SOB, orthopnea, PND, CP, palpitations, dizziness ,syncope or ICD firing. He states his walking is not been limited by shortness of breath, but he has not been walking outside due to the colder weather.  He walks with his wife for several blocks daily when the weather is nice. He can climb stairs without dyspnea. He sleeps with one pillow with no orthopnea. EKG today with AV biventricular paced rhythm at 50 bpm.  \par \par Pt had Covid 19 vaccine x 2 and booster without issue.

## 2022-05-26 ENCOUNTER — APPOINTMENT (OUTPATIENT)
Dept: ELECTROPHYSIOLOGY | Facility: CLINIC | Age: 82
End: 2022-05-26
Payer: MEDICARE

## 2022-05-26 ENCOUNTER — NON-APPOINTMENT (OUTPATIENT)
Age: 82
End: 2022-05-26

## 2022-05-26 PROCEDURE — 93295 DEV INTERROG REMOTE 1/2/MLT: CPT

## 2022-05-26 PROCEDURE — 93296 REM INTERROG EVL PM/IDS: CPT

## 2022-07-12 ENCOUNTER — NON-APPOINTMENT (OUTPATIENT)
Age: 82
End: 2022-07-12

## 2022-07-20 ENCOUNTER — APPOINTMENT (OUTPATIENT)
Dept: ELECTROPHYSIOLOGY | Facility: CLINIC | Age: 82
End: 2022-07-20

## 2022-07-20 ENCOUNTER — NON-APPOINTMENT (OUTPATIENT)
Age: 82
End: 2022-07-20

## 2022-07-20 VITALS
DIASTOLIC BLOOD PRESSURE: 69 MMHG | HEART RATE: 51 BPM | SYSTOLIC BLOOD PRESSURE: 129 MMHG | HEIGHT: 70 IN | OXYGEN SATURATION: 99 % | BODY MASS INDEX: 23.91 KG/M2 | WEIGHT: 167 LBS

## 2022-07-20 DIAGNOSIS — Z45.02 ENCOUNTER FOR ADJUSTMENT AND MANAGEMENT OF AUTOMATIC IMPLANTABLE CARDIAC DEFIBRILLATOR: ICD-10-CM

## 2022-07-20 PROCEDURE — 93000 ELECTROCARDIOGRAM COMPLETE: CPT

## 2022-07-20 PROCEDURE — 99214 OFFICE O/P EST MOD 30 MIN: CPT

## 2022-07-20 NOTE — HISTORY OF PRESENT ILLNESS
[FreeTextEntry1] : Referring Physician: Sami Cuba MD\par  \par Dear Dr. Cuba:\par  \par Mr. Aquino was seen in the [facility] today. For our records, please allow me to summarize the history and my findings.\par  \par This pleasant 81 year old man has a cardiovascular history significant for Stage C/Class II ischemic CM with EF of 30-35%, CAD s/p PCI, and CVA on Coumadin who initially had an BiV ICD placed in 2001. He has a generator change in 2010. His device is now again at [facility]. He has no complaints today.\par \par Mr. Aquino denies any recent history of chest pain, shortness of breath, palpitations, dizziness, or syncope.\par

## 2022-07-20 NOTE — CARDIOLOGY SUMMARY
[de-identified] : 7/20/22 - A pacec, BiV Paced, at 50 bpm. [de-identified] : 6/1/21: LV mildly enlarged, severe LV dysfunction with EF of 30-35%, Moderate LA enlargement, normal RV, no significant valvular disease.

## 2022-07-20 NOTE — DISCUSSION/SUMMARY
[FreeTextEntry1] : In summary, this is a 81 year old man with Stage C/Class II ischemic CM with EF of 30-35%, CAD s/p PCI, and CVA on Coumadin. He has a BiV-ICD that is at [facility]. The rationale for generator change and well as the procedural risks--including but not limited to pocket hematoma, infection, pneumothorax, pericardial bleed/tamponade, lead dislodgement, and death--were reviewed in detail. After consideration of this information, the decision was made to proceed with the generator change.\par  \par Mr. Aquino appeared to understand the whole discussion and verbalized that all of his questions were answered to his satisfaction.\par  \par Thank you for allowing me to be involved in the care of this pleasant man. Please feel free to contact me with any questions. [EKG obtained to assist in diagnosis and management of assessed problem(s)] : EKG obtained to assist in diagnosis and management of assessed problem(s)

## 2022-07-22 ENCOUNTER — OUTPATIENT (OUTPATIENT)
Dept: OUTPATIENT SERVICES | Facility: HOSPITAL | Age: 82
LOS: 1 days | End: 2022-07-22

## 2022-07-22 VITALS
HEIGHT: 67 IN | WEIGHT: 169.09 LBS | TEMPERATURE: 97 F | RESPIRATION RATE: 16 BRPM | HEART RATE: 59 BPM | SYSTOLIC BLOOD PRESSURE: 109 MMHG | DIASTOLIC BLOOD PRESSURE: 69 MMHG | OXYGEN SATURATION: 96 %

## 2022-07-22 DIAGNOSIS — Z95.810 PRESENCE OF AUTOMATIC (IMPLANTABLE) CARDIAC DEFIBRILLATOR: Chronic | ICD-10-CM

## 2022-07-22 DIAGNOSIS — I10 ESSENTIAL (PRIMARY) HYPERTENSION: ICD-10-CM

## 2022-07-22 DIAGNOSIS — Z90.89 ACQUIRED ABSENCE OF OTHER ORGANS: Chronic | ICD-10-CM

## 2022-07-22 DIAGNOSIS — Z45.02 ENCOUNTER FOR ADJUSTMENT AND MANAGEMENT OF AUTOMATIC IMPLANTABLE CARDIAC DEFIBRILLATOR: ICD-10-CM

## 2022-07-22 DIAGNOSIS — T82.111D BREAKDOWN (MECHANICAL) OF CARDIAC PULSE GENERATOR (BATTERY), SUBSEQUENT ENCOUNTER: ICD-10-CM

## 2022-07-22 DIAGNOSIS — Z95.5 PRESENCE OF CORONARY ANGIOPLASTY IMPLANT AND GRAFT: Chronic | ICD-10-CM

## 2022-07-22 DIAGNOSIS — T82.9XXA UNSPECIFIED COMPLICATION OF CARDIAC AND VASCULAR PROSTHETIC DEVICE, IMPLANT AND GRAFT, INITIAL ENCOUNTER: Chronic | ICD-10-CM

## 2022-07-22 DIAGNOSIS — I25.10 ATHEROSCLEROTIC HEART DISEASE OF NATIVE CORONARY ARTERY WITHOUT ANGINA PECTORIS: ICD-10-CM

## 2022-07-22 LAB
ANION GAP SERPL CALC-SCNC: 9 MMOL/L — SIGNIFICANT CHANGE UP (ref 7–14)
APTT BLD: 44.2 SEC — HIGH (ref 27–36.3)
BLD GP AB SCN SERPL QL: NEGATIVE — SIGNIFICANT CHANGE UP
BUN SERPL-MCNC: 37 MG/DL — HIGH (ref 7–23)
CALCIUM SERPL-MCNC: 9.4 MG/DL — SIGNIFICANT CHANGE UP (ref 8.4–10.5)
CHLORIDE SERPL-SCNC: 106 MMOL/L — SIGNIFICANT CHANGE UP (ref 98–107)
CO2 SERPL-SCNC: 23 MMOL/L — SIGNIFICANT CHANGE UP (ref 22–31)
CREAT SERPL-MCNC: 1.58 MG/DL — HIGH (ref 0.5–1.3)
EGFR: 44 ML/MIN/1.73M2 — LOW
GLUCOSE SERPL-MCNC: 89 MG/DL — SIGNIFICANT CHANGE UP (ref 70–99)
HCT VFR BLD CALC: 35.1 % — LOW (ref 39–50)
HGB BLD-MCNC: 11.7 G/DL — LOW (ref 13–17)
INR BLD: 2.87 RATIO — HIGH (ref 0.88–1.16)
MAGNESIUM SERPL-MCNC: 1.9 MG/DL — SIGNIFICANT CHANGE UP (ref 1.6–2.6)
MCHC RBC-ENTMCNC: 29.8 PG — SIGNIFICANT CHANGE UP (ref 27–34)
MCHC RBC-ENTMCNC: 33.3 GM/DL — SIGNIFICANT CHANGE UP (ref 32–36)
MCV RBC AUTO: 89.3 FL — SIGNIFICANT CHANGE UP (ref 80–100)
NRBC # BLD: 0 /100 WBCS — SIGNIFICANT CHANGE UP
NRBC # FLD: 0 K/UL — SIGNIFICANT CHANGE UP
PHOSPHATE SERPL-MCNC: 2.8 MG/DL — SIGNIFICANT CHANGE UP (ref 2.5–4.5)
PLATELET # BLD AUTO: 150 K/UL — SIGNIFICANT CHANGE UP (ref 150–400)
POTASSIUM SERPL-MCNC: 4.4 MMOL/L — SIGNIFICANT CHANGE UP (ref 3.5–5.3)
POTASSIUM SERPL-SCNC: 4.4 MMOL/L — SIGNIFICANT CHANGE UP (ref 3.5–5.3)
PROTHROM AB SERPL-ACNC: 33.7 SEC — HIGH (ref 10.5–13.4)
RBC # BLD: 3.93 M/UL — LOW (ref 4.2–5.8)
RBC # FLD: 14.3 % — SIGNIFICANT CHANGE UP (ref 10.3–14.5)
RH IG SCN BLD-IMP: POSITIVE — SIGNIFICANT CHANGE UP
SODIUM SERPL-SCNC: 138 MMOL/L — SIGNIFICANT CHANGE UP (ref 135–145)
WBC # BLD: 5.09 K/UL — SIGNIFICANT CHANGE UP (ref 3.8–10.5)
WBC # FLD AUTO: 5.09 K/UL — SIGNIFICANT CHANGE UP (ref 3.8–10.5)

## 2022-07-22 NOTE — H&P PST ADULT - NSANTHOSAYNRD_GEN_A_CORE
No. FOUZIA screening performed.  STOP BANG Legend: 0-2 = LOW Risk; 3-4 = INTERMEDIATE Risk; 5-8 = HIGH Risk

## 2022-07-22 NOTE — H&P PST ADULT - PROBLEM SELECTOR PLAN 1
Pt now scheduled for AICD / pacemaker generator Change on 8/1/22  Pre-op instructions provided. Pt given verbal and written instructions with teach back. Pt verbalized understanding with return demonstration.   Pending labs  Covid testing scheduled prior to surgery  Pt/INR stat DOS

## 2022-07-22 NOTE — H&P PST ADULT - NEGATIVE GENERAL GENITOURINARY SYMPTOMS
no hematuria/no renal colic/no flank pain L/no flank pain R/no urine discoloration/no gas in urine/no bladder infections/no incontinence/no dysuria/no urinary hesitancy/normal urinary frequency

## 2022-07-22 NOTE — H&P PST ADULT - NSICDXPASTSURGICALHX_GEN_ALL_CORE_FT
PAST SURGICAL HISTORY:  AICD (automatic cardioverter/defibrillator) present 2001    S/P primary angioplasty with coronary stent 1996    S/P tonsillectomy

## 2022-07-22 NOTE — H&P PST ADULT - HISTORY OF PRESENT ILLNESS
81 year old male with PMH of HTN, HDL, CAD, presents to Presurgical testing with diagnosis of 81 year old male with PMH of HTN, HDL, CAD, presents to Presurgical testing with diagnosis of CAD, s/p Cardiac stent x 1, AICD/Pacemaker, HTN, HLD, Stroke Syndrome- Right ear hearing loss, Glaucoma, presents for preop evaluation. Pt now scheduled for AICD / pacemaker generator Change.

## 2022-07-22 NOTE — H&P PST ADULT - NSICDXPASTMEDICALHX_GEN_ALL_CORE_FT
PAST MEDICAL HISTORY:  AICD at End of Battery Life 2006    CAD (Coronary Artery Disease)     Cardiac Aneurysm Apical    CHF (Congestive Heart Failure)     Coronary Stent LAD x 1 - 1996    CVA (Cerebral Infarction) 2001    Ischemic Cardiomyopathy

## 2022-08-01 ENCOUNTER — OUTPATIENT (OUTPATIENT)
Dept: OUTPATIENT SERVICES | Facility: HOSPITAL | Age: 82
LOS: 1 days | Discharge: HOME | End: 2022-08-01

## 2022-08-01 DIAGNOSIS — Z95.810 PRESENCE OF AUTOMATIC (IMPLANTABLE) CARDIAC DEFIBRILLATOR: Chronic | ICD-10-CM

## 2022-08-01 DIAGNOSIS — Z90.89 ACQUIRED ABSENCE OF OTHER ORGANS: Chronic | ICD-10-CM

## 2022-08-01 DIAGNOSIS — Z45.02 ENCOUNTER FOR ADJUSTMENT AND MANAGEMENT OF AUTOMATIC IMPLANTABLE CARDIAC DEFIBRILLATOR: ICD-10-CM

## 2022-08-01 DIAGNOSIS — Z95.5 PRESENCE OF CORONARY ANGIOPLASTY IMPLANT AND GRAFT: Chronic | ICD-10-CM

## 2022-08-01 LAB
INR BLD: 2.27 RATIO — HIGH (ref 0.88–1.16)
PROTHROM AB SERPL-ACNC: 26.6 SEC — HIGH (ref 10.5–13.4)

## 2022-08-01 PROCEDURE — 33264 RMVL & RPLCMT DFB GEN MLT LD: CPT

## 2022-08-01 PROCEDURE — 93010 ELECTROCARDIOGRAM REPORT: CPT

## 2022-08-01 RX ORDER — MULTIVIT-MIN/FERROUS GLUCONATE 9 MG/15 ML
1 LIQUID (ML) ORAL
Qty: 0 | Refills: 0 | DISCHARGE

## 2022-08-01 RX ORDER — SPIRONOLACTONE 25 MG/1
1 TABLET, FILM COATED ORAL
Qty: 0 | Refills: 0 | DISCHARGE

## 2022-08-01 RX ORDER — CYST/ALA/Q10/PHOS.SER/DHA/BROC 100-20-50
100 POWDER (GRAM) ORAL
Qty: 0 | Refills: 0 | DISCHARGE

## 2022-08-01 RX ORDER — SIMVASTATIN 20 MG/1
1 TABLET, FILM COATED ORAL
Qty: 0 | Refills: 0 | DISCHARGE

## 2022-08-01 RX ORDER — MULTIVIT-MIN/FERROUS GLUCONATE 9 MG/15 ML
2 LIQUID (ML) ORAL
Qty: 0 | Refills: 0 | DISCHARGE

## 2022-08-01 RX ORDER — RAMIPRIL 5 MG
1 CAPSULE ORAL
Qty: 0 | Refills: 0 | DISCHARGE

## 2022-08-01 RX ORDER — PREGABALIN 225 MG/1
1 CAPSULE ORAL
Qty: 0 | Refills: 0 | DISCHARGE

## 2022-08-01 RX ORDER — CARVEDILOL PHOSPHATE 80 MG/1
1 CAPSULE, EXTENDED RELEASE ORAL
Qty: 0 | Refills: 0 | DISCHARGE

## 2022-08-01 RX ORDER — PILOCARPINE HCL 4 %
2 DROPS OPHTHALMIC (EYE)
Qty: 0 | Refills: 0 | DISCHARGE

## 2022-08-01 RX ORDER — CHOLECALCIFEROL (VITAMIN D3) 125 MCG
1 CAPSULE ORAL
Qty: 0 | Refills: 0 | DISCHARGE

## 2022-08-01 RX ORDER — WARFARIN SODIUM 2.5 MG/1
1 TABLET ORAL
Qty: 0 | Refills: 0 | DISCHARGE

## 2022-08-01 RX ORDER — LATANOPROST 0.05 MG/ML
1 SOLUTION/ DROPS OPHTHALMIC; TOPICAL
Qty: 0 | Refills: 0 | DISCHARGE

## 2022-08-01 NOTE — CHART NOTE - NSCHARTNOTEFT_GEN_A_CORE
pt. is s/p ICD PG change    L ACW site  was pressure dressed. It was  clean, dry, and intact. No bleeding, drainage hematoma, or ecchymosis appreciated.        Post-op ICD instruction has been verbally explained and given to the patient. Patient was also given home monitor, booklet and ID card. Patient expressed understanding and all questions were answered. A copy of the instruction is located in the patients chart   Patient is schedule for an appointment on 8/17/22 at 10:40am in the EP Clinic ( 4th floor Oncology building [facility] [address], [place], [place], [address] [phone] )    No scrubbing the incision site for 2 weeks  No lotion, ointment, powder or direct sunlight to the incision site for 2 weeks   No lifting more than 5lb or exertional exercising such as jogging, running, bike riding for 6-8 weeks  Do not get the surgical incision wet for 1 week  No swimming pool, Jacuzzi, or bath for 6-8 weeks.   You should carry your ID card for metal detectors   Remove bandage after 24-48hours  Patient can shower 24 hours after procedure. Pat the area dry  Keep Cellular phone 6 in away  from the device  Pt was instructed to call [phone] if the following occurs:      - fever with temperature > 100.6      - swelling, drainage or bleeding at the site incision       - chest pain, SOB, n/v      - if you believe you were shock and then go to the ED

## 2022-08-01 NOTE — CHART NOTE - NSCHARTNOTEFT_GEN_A_CORE
Interventional Recovery Suite PA Note      Patient is a 81 year old male with PMH of HTN, HDL, CAD, s/p Cardiac stent x 1, AICD/Pacemaker, CVA ( on Coumadin) arrived for ICD  gen change.  ICD reaching BREN;   PST H&P appreciated; patient seen and examined today; physical exam unremarkable.  Medication reconciliation reviewed, this morning patient took Coreg 12.5 mg , Simvastatin 40 mg.  Last dose of Coumadin 5 mg  was taken yesterday on 7/31/22 at 7 pm.  The process of the procedures along with the risk and benefits for the procedure were explained in detail which included but not limited to bleeding, infection, stroke. Patient expressed understanding an all questions were answered.  Patient denies chest pain, SOB, palpitations, dizziness, presyncope, syncope,  headache, visual disturbances, PE, DVT, abdominal pain, N/V/D/C, hematochezia, melena, dysuria, hematuria, fever, chills. Interventional Recovery Suite PA Note      Patient is a 81 year old male with PMH of HTN, HDL, CAD, s/p Cardiac stent x 1, ischemic CMP with EF 35%, s/p  BIV ICD, CVA ( on Coumadin) arrived for ICD  gen change.  ICD reaching BREN;   PST H&P appreciated; patient seen and examined today; physical exam unremarkable.  Medication reconciliation reviewed, this morning patient took Coreg 12.5 mg , Simvastatin 40 mg.  Last dose of Coumadin 5 mg  was taken yesterday on 7/31/22 at 7 pm.  The process of the procedures along with the risk and benefits for the procedure were explained in detail which included but not limited to bleeding, infection, stroke. Patient expressed understanding an all questions were answered.  Patient denies chest pain, SOB, palpitations, dizziness, presyncope, syncope,  headache, visual disturbances, PE, DVT, abdominal pain, N/V/D/C, hematochezia, melena, dysuria, hematuria, fever, chills.

## 2022-08-02 ENCOUNTER — NON-APPOINTMENT (OUTPATIENT)
Age: 82
End: 2022-08-02

## 2022-08-09 ENCOUNTER — NON-APPOINTMENT (OUTPATIENT)
Age: 82
End: 2022-08-09

## 2022-08-17 ENCOUNTER — APPOINTMENT (OUTPATIENT)
Dept: ELECTROPHYSIOLOGY | Facility: CLINIC | Age: 82
End: 2022-08-17

## 2022-08-17 VITALS — RESPIRATION RATE: 14 BRPM | DIASTOLIC BLOOD PRESSURE: 65 MMHG | HEART RATE: 50 BPM | SYSTOLIC BLOOD PRESSURE: 109 MMHG

## 2022-08-17 PROCEDURE — 99024 POSTOP FOLLOW-UP VISIT: CPT

## 2022-08-25 ENCOUNTER — APPOINTMENT (OUTPATIENT)
Dept: CARDIOLOGY | Facility: CLINIC | Age: 82
End: 2022-08-25

## 2022-08-25 VITALS
HEIGHT: 70 IN | BODY MASS INDEX: 22.81 KG/M2 | SYSTOLIC BLOOD PRESSURE: 118 MMHG | TEMPERATURE: 98 F | OXYGEN SATURATION: 98 % | DIASTOLIC BLOOD PRESSURE: 72 MMHG | HEART RATE: 50 BPM

## 2022-08-25 VITALS — BODY MASS INDEX: 22.81 KG/M2 | WEIGHT: 159 LBS

## 2022-08-25 PROCEDURE — 93000 ELECTROCARDIOGRAM COMPLETE: CPT

## 2022-08-25 PROCEDURE — 99213 OFFICE O/P EST LOW 20 MIN: CPT

## 2022-08-25 NOTE — PHYSICAL EXAM
[Well Developed] : well developed [No Acute Distress] : no acute distress [Normal Conjunctiva] : normal conjunctiva [Clear Lung Fields] : clear lung fields [Good Air Entry] : good air entry [Soft] : abdomen soft [Non Tender] : non-tender [No Rash] : no rash [Normal] : alert and oriented, normal memory [Normal Gait] : normal gait [No Edema] : no edema [Alert and Oriented] : alert and oriented [de-identified] : Elderly male  [de-identified] : JVP 6-8 cm  [de-identified] : kristian rate 50's, Left chest [employer] CRT-D, irregular/ regular

## 2022-08-25 NOTE — ASSESSMENT
[FreeTextEntry1] : 81 year old man with history of coronary artery disease, s/p PCI with resultant ischemic cardiomyopathy, HFrEF LVEF 25-30% , S/P BiV ICD 11/26/2010 and CKD. Last TTE 8/18/21 with LVEF 30-35%,  severe LV systolic dysfunction.  \par \par  ACC/AHA Stage C, NYHA Class II\par Appears euvolemic and normotensive

## 2022-08-25 NOTE — HISTORY OF PRESENT ILLNESS
[FreeTextEntry1] : 81 year old man with history of coronary artery disease, s/p PCI with resultant ischemic cardiomyopathy, HFrEF LVEF 25-30% , S/P BiV ICD 11/26/2010 and CKD. Last TTE 8/18/21 with LVEF 30-35%,  severe LV systolic dysfunction..Pt had ICD battery changed without issues. Pt is here for a follow up today.\par \par  \par  His home weight has been stable in the range of 155-159 lbs.  He feels well with  no acute SOB, orthopnea, PND, CP, palpitations, dizziness ,syncope or ICD firing. He states his walking is not been limited by shortness of breath.  He walks with his wife for several blocks daily when the weather is nice. He can climb stairs without dyspnea. He sleeps with one pillow with no orthopnea. EKG today with AV biventricular paced rhythm at 50 bpm.  \par He is scheduled for Cataract surgery September 26, 2022. He is here for cardiac clearance\par \par Pt had Covid 19 vaccine x 2 and booster without issue.\par Labs 7/22\par H/H 5/35\par INR 2.36 ( per pt on home device 8/16/22)\par K 4,4; BUN 37; creat 1.59 ( which is his base line)

## 2022-08-25 NOTE — DISCUSSION/SUMMARY
[Patient] : the patient [___ Month(s)] : in [unfilled] month(s) [FreeTextEntry1] : Pt is stable. No change in medications at this time. Pt will discuss with cataract surgeon regarding need to hold warfain prior to surgery.

## 2022-09-09 ENCOUNTER — NON-APPOINTMENT (OUTPATIENT)
Age: 82
End: 2022-09-09

## 2022-12-12 ENCOUNTER — APPOINTMENT (OUTPATIENT)
Dept: CARDIOLOGY | Facility: CLINIC | Age: 82
End: 2022-12-12

## 2022-12-12 ENCOUNTER — APPOINTMENT (OUTPATIENT)
Dept: ELECTROPHYSIOLOGY | Facility: CLINIC | Age: 82
End: 2022-12-12

## 2022-12-12 ENCOUNTER — NON-APPOINTMENT (OUTPATIENT)
Age: 82
End: 2022-12-12

## 2022-12-12 VITALS
WEIGHT: 163 LBS | HEART RATE: 56 BPM | OXYGEN SATURATION: 98 % | SYSTOLIC BLOOD PRESSURE: 124 MMHG | BODY MASS INDEX: 23.39 KG/M2 | DIASTOLIC BLOOD PRESSURE: 71 MMHG

## 2022-12-12 DIAGNOSIS — H40.9 UNSPECIFIED GLAUCOMA: ICD-10-CM

## 2022-12-12 PROCEDURE — 93000 ELECTROCARDIOGRAM COMPLETE: CPT

## 2022-12-12 PROCEDURE — 99214 OFFICE O/P EST MOD 30 MIN: CPT

## 2022-12-12 PROCEDURE — 93284 PRGRMG EVAL IMPLANTABLE DFB: CPT

## 2022-12-12 RX ORDER — OFLOXACIN 3 MG/ML
0.3 SOLUTION/ DROPS OPHTHALMIC
Qty: 5 | Refills: 0 | Status: ACTIVE | COMMUNITY
Start: 2022-08-18

## 2022-12-12 RX ORDER — LOTEPREDNOL ETABONATE 10 MG/ML
1 SUSPENSION TOPICAL
Qty: 3 | Refills: 0 | Status: ACTIVE | COMMUNITY
Start: 2022-08-18

## 2022-12-12 RX ORDER — TROPICAMIDE 10 MG/ML
1 SOLUTION/ DROPS OPHTHALMIC
Qty: 3 | Refills: 0 | Status: ACTIVE | COMMUNITY
Start: 2022-08-18

## 2022-12-12 RX ORDER — BROMFENAC SODIUM 0.7 MG/ML
0.07 SOLUTION/ DROPS OPHTHALMIC
Qty: 3 | Refills: 0 | Status: ACTIVE | COMMUNITY
Start: 2022-08-18

## 2022-12-12 NOTE — CARDIOLOGY SUMMARY
[de-identified] : 12/12/22 Biventricular paced rhythm rate 52 bpm, NSST\par 2/24/22 Atrial paced 50 with biventricular paced rhythm, NSST\par 2/22/21 Atrial paced 50 with biventricular paced rhythm, NSST\par 8/17/20 Atrial paced with biventricular paced rhythm rate 50, NSST\par  \par  [de-identified] : Echo: TTE; 8/18/21 with LVEF 30-35%, \par \par TTE with Dr. Thorne on 9/14/2019 LVEF 40% with LVEDD 6 cm with moderate LV systolic dysfunction and mild Stage 1 diastolic dysfunction \par \par TTE 11/8/18 with no significant change with LVEF 26% normal LA, severe segmental LV systolic dysfunction, no LV thrombus, normal RV systolic function, mild to mod TR. No significant change from 1/17/14\par \par TTE 1/7/14 LVEF 25-30% severe LV dysfunction, no pulmonary hypertension, normal LA size, no mitral regurgitation \par

## 2022-12-12 NOTE — HISTORY OF PRESENT ILLNESS
[FreeTextEntry1] : Nolan Aquino is an 81 year old man with history of coronary artery disease, s/p PCI with resultant ischemic cardiomyopathy, HFrEF LVEF 25-30% , S/P BiV ICD 11/26/2010 and CKD. Last TTE 8/18/21 with LVEF 30-35%,  severe LV systolic dysfunction..S/P CRT-D generator change 8/1/22 ICD battery changed without issues. S/P right cataract surgery 9/26/22. Pt is here for a follow up today.\par \par SInce his last visit on 8/25/22, he has been doing well. States he ran out of his jona and has had a hard time refilling it. He is taking his other meds as directed. He had right cataract surgery September 26, 2022 and completed his course of eye gtts. \par His home weight has been stable in the range of 159-163 lbs from prior 151-155 lbs.  He feels well with  no acute SOB, orthopnea, PND, CP, palpitations, dizziness ,syncope or ICD firing. \par He states his walking is not been limited by shortness of breath.  He walks with his wife for several blocks daily when the weather is nice. He can climb stairs without dyspnea. He sleeps with one pillow with no orthopnea. EKG today with biventricular paced rhythm at 52 bpm.  Pt received a new remote [employer] latitude monitor today and it was paired with his device. \par Last labs 7/22/22; \par INR 2.36 ( per pt on home device 8/16/22)\par K 4,4; BUN 37; creat 1.59 ( which is his base line)\par \par Pt had Covid 19 vaccine x 2 and booster without issue. He had the flu vaccine. \par

## 2022-12-12 NOTE — DISCUSSION/SUMMARY
[Patient] : the patient [___ Month(s)] : in [unfilled] month(s) [FreeTextEntry1] : 1. Chronic systolic HF- NYHA class I symptoms. Appears euvolemic and normotensive \par - continue ramipril to 2.5 mg daily ( previously 5 mg secondary but decreased due to low B/P episodes)\par - restart jona 25 mg daily and recheck labs with PCP\par -Continue with GDMT. Dosages not uptitrated due to prior hypotension at home \par - TTE 11/8/18 with LVEf 26% with no significant change from 1/17/14 and repeat at Dr. Cordova's (now PCP Dr. Camacho) 9/14/19 with improved LVEF 40% and repeat 8/18/21 with LVEF 30-35%, severe LV systolic dysfunction, mild MR\par - limit salt to less than 2000 mg per day\par - limit fluid to less than 2 liters per day\par - continue daily exercise\par - repeat TTE with next visit\par \par 2. CAD S/P PCI- no active chest pain\par - continue Coreg at current dose of 12.5 mg bid, will not increase HR 52 bpm\par - pt will call if he has any change in symptoms and will do a NST\par \par 3. CKD- creat 2/8/19 was 1.42 to 1.6 on labs 8/2021\par -will obtain recent labs from Dr. Root who is took the place of Dr. Jeniffer Cordova who retired [phone] and hematologist Dr. Pineda [phone].\par \par 4. Hx stroke syndrome\par - pt is on Coumadin with INR in therapeutic range ( usually takes 6 mg daily alternating with 5 mg) \par \par \par Follow up in office in 4 months with device check \par \par  [EKG obtained to assist in diagnosis and management of assessed problem(s)] : EKG obtained to assist in diagnosis and management of assessed problem(s)

## 2022-12-12 NOTE — PHYSICAL EXAM
[Well Developed] : well developed [No Acute Distress] : no acute distress [Normal Conjunctiva] : normal conjunctiva [Clear Lung Fields] : clear lung fields [Good Air Entry] : good air entry [Soft] : abdomen soft [Non Tender] : non-tender [Normal Gait] : normal gait [No Edema] : no edema [No Rash] : no rash [Normal] : alert and oriented, normal memory [Alert and Oriented] : alert and oriented [de-identified] : Elderly male  [de-identified] : JVP 8 cm  [de-identified] : kristian rate 50's, Left chest [employer] CRT-D, irregular/ regular

## 2023-01-01 ENCOUNTER — APPOINTMENT (OUTPATIENT)
Dept: ELECTROPHYSIOLOGY | Facility: CLINIC | Age: 83
End: 2023-01-01
Payer: MEDICARE

## 2023-01-01 ENCOUNTER — RX RENEWAL (OUTPATIENT)
Age: 83
End: 2023-01-01

## 2023-01-01 ENCOUNTER — NON-APPOINTMENT (OUTPATIENT)
Age: 83
End: 2023-01-01

## 2023-01-01 ENCOUNTER — APPOINTMENT (OUTPATIENT)
Dept: HEART FAILURE | Facility: CLINIC | Age: 83
End: 2023-01-01
Payer: MEDICARE

## 2023-01-01 VITALS
HEART RATE: 51 BPM | OXYGEN SATURATION: 100 % | SYSTOLIC BLOOD PRESSURE: 135 MMHG | BODY MASS INDEX: 25.97 KG/M2 | HEIGHT: 70 IN | DIASTOLIC BLOOD PRESSURE: 77 MMHG

## 2023-01-01 VITALS — BODY MASS INDEX: 25.91 KG/M2 | HEIGHT: 70 IN | WEIGHT: 181 LBS

## 2023-01-01 VITALS
BODY MASS INDEX: 25.34 KG/M2 | DIASTOLIC BLOOD PRESSURE: 62 MMHG | HEART RATE: 52 BPM | WEIGHT: 177 LBS | HEIGHT: 70 IN | OXYGEN SATURATION: 100 % | SYSTOLIC BLOOD PRESSURE: 104 MMHG

## 2023-01-01 PROCEDURE — 99214 OFFICE O/P EST MOD 30 MIN: CPT

## 2023-01-01 PROCEDURE — 93295 DEV INTERROG REMOTE 1/2/MLT: CPT

## 2023-01-01 PROCEDURE — 93000 ELECTROCARDIOGRAM COMPLETE: CPT

## 2023-01-01 PROCEDURE — 93296 REM INTERROG EVL PM/IDS: CPT

## 2023-01-06 NOTE — ED PROVIDER NOTE - ENMT, MLM
Discharge Instruction for Undelivered Patients      You were seen for:  Nausea and Vomiting  We Consulted: Dr. Tam  You had (Test or Medicine):Iv fluids, iv zofran     Diet:   Drink 8 to 12 glasses of liquids (milk, juice, water) every day.     Activity:  Call your doctor or nurse midwife if your baby is moving less than usual.     Call your provider if you notice:  Swelling in your face or increased swelling in your hands or legs.  Headaches that are not relieved by Tylenol (acetaminophen).  Changes in your vision (blurring: seeing spots or stars.)  Nausea (sick to your stomach) and vomiting (throwing up).   Weight gain of 5 pounds or more per week.  Heartburn that doesn't go away.  Signs of bladder infection: pain when you urinate (use the toilet), need to go more often and more urgently.  The bag of spence (rupture of membranes) breaks, or you notice leaking in your underwear.  Bright red blood in your underwear.  Abdominal (lower belly) or stomach pain.  For first baby: Contractions (tightening) less than 5 minutes apart for one hour or more.  Second (plus) baby: Contractions (tightening) less than 10 minutes apart and getting stronger.  *If less than 34 weeks: Contractions (tightening) more than 6 times in one hour.  Increase or change in vaginal discharge (note the color and amount)  Other:     Follow-up:  As scheduled in the clinic         Airway patent, Nasal mucosa clear. Mouth with normal mucosa. Throat has no vesicles, no oropharyngeal exudates and uvula is midline.

## 2023-03-14 ENCOUNTER — APPOINTMENT (OUTPATIENT)
Dept: ELECTROPHYSIOLOGY | Facility: CLINIC | Age: 83
End: 2023-03-14
Payer: MEDICARE

## 2023-03-14 ENCOUNTER — NON-APPOINTMENT (OUTPATIENT)
Age: 83
End: 2023-03-14

## 2023-03-14 PROCEDURE — 93296 REM INTERROG EVL PM/IDS: CPT

## 2023-03-14 PROCEDURE — 93295 DEV INTERROG REMOTE 1/2/MLT: CPT

## 2023-04-13 ENCOUNTER — APPOINTMENT (OUTPATIENT)
Dept: HEART FAILURE | Facility: CLINIC | Age: 83
End: 2023-04-13

## 2023-04-13 ENCOUNTER — APPOINTMENT (OUTPATIENT)
Dept: HEART FAILURE | Facility: CLINIC | Age: 83
End: 2023-04-13
Payer: MEDICARE

## 2023-04-13 ENCOUNTER — APPOINTMENT (OUTPATIENT)
Dept: CV DIAGNOSITCS | Facility: HOSPITAL | Age: 83
End: 2023-04-13
Payer: MEDICARE

## 2023-04-13 ENCOUNTER — OUTPATIENT (OUTPATIENT)
Dept: OUTPATIENT SERVICES | Facility: HOSPITAL | Age: 83
LOS: 1 days | End: 2023-04-13

## 2023-04-13 ENCOUNTER — NON-APPOINTMENT (OUTPATIENT)
Age: 83
End: 2023-04-13

## 2023-04-13 ENCOUNTER — APPOINTMENT (OUTPATIENT)
Dept: ELECTROPHYSIOLOGY | Facility: CLINIC | Age: 83
End: 2023-04-13
Payer: MEDICARE

## 2023-04-13 VITALS
HEIGHT: 70 IN | HEART RATE: 55 BPM | TEMPERATURE: 97.3 F | DIASTOLIC BLOOD PRESSURE: 71 MMHG | OXYGEN SATURATION: 98 % | WEIGHT: 173.25 LBS | SYSTOLIC BLOOD PRESSURE: 126 MMHG | BODY MASS INDEX: 24.8 KG/M2

## 2023-04-13 DIAGNOSIS — I42.9 CARDIOMYOPATHY, UNSPECIFIED: ICD-10-CM

## 2023-04-13 DIAGNOSIS — Z90.89 ACQUIRED ABSENCE OF OTHER ORGANS: Chronic | ICD-10-CM

## 2023-04-13 DIAGNOSIS — Z95.5 PRESENCE OF CORONARY ANGIOPLASTY IMPLANT AND GRAFT: Chronic | ICD-10-CM

## 2023-04-13 DIAGNOSIS — Z95.810 PRESENCE OF AUTOMATIC (IMPLANTABLE) CARDIAC DEFIBRILLATOR: Chronic | ICD-10-CM

## 2023-04-13 DIAGNOSIS — I50.22 CHRONIC SYSTOLIC (CONGESTIVE) HEART FAILURE: ICD-10-CM

## 2023-04-13 PROCEDURE — 36415 COLL VENOUS BLD VENIPUNCTURE: CPT

## 2023-04-13 PROCEDURE — 93000 ELECTROCARDIOGRAM COMPLETE: CPT

## 2023-04-13 PROCEDURE — 99214 OFFICE O/P EST MOD 30 MIN: CPT

## 2023-04-13 PROCEDURE — 93306 TTE W/DOPPLER COMPLETE: CPT | Mod: 26

## 2023-04-13 PROCEDURE — 93284 PRGRMG EVAL IMPLANTABLE DFB: CPT

## 2023-04-13 NOTE — HISTORY OF PRESENT ILLNESS
[FreeTextEntry1] : Nolan Aquino is an 82 year old man with history of coronary artery disease, s/p PCI with resultant ischemic cardiomyopathy, HFrEF LVEF 25-30% , S/P BiV ICD 11/26/2010 and CKD. Last TTE 8/18/21 with LVEF 30-35%,  severe LV systolic dysfunction to LVEF 24% on TTE ( 4/13/23) ..S/P CRT-D generator change 8/1/22 ICD battery changed without issues. S/P right cataract surgery 9/26/22. Pt is here for a follow up today and device check after TTE.\par \par Patient is here for a follow up after TTE today notable for no significant change from 11/8/2018 with LVEF 24%, LVIDD 6.3 cm, mild LAE, severe segmental LV systolic dysfunction, no LV thrombus, mild diastolic dysfunction Stage 1, normal RV systolic function, mild TR \par \par SInce his last visit on 8/25/22, he has been doing well. His home weight increased to 167 lbs from prior 161 lbs with increasing calories. States he can walk approx 1/2 mile without dyspnea and climbs the stairs several times a day in his home without dyspnea. He sleeps with one pillow with no orthopnea. EKG today with biventricular paced rhythm at 55 bpm.  He is adhering to low salt diet and is drinking less than 2 liters of fluid per day. \par \par He feels well with  no acute SOB, orthopnea, PND, CP, palpitations, dizziness ,syncope or ICD firing. \par \par Last labs 7/22/22; \par INR 2.36 ( per pt on home device 8/16/22)\par K 4,4; BUN 37; creat 1.59 ( which is his base line). He requested labs to be done today in office including INR ( managed by PCP). \par \par Pt had Covid 19 vaccine x 2 and booster without issue. He had the flu vaccine. \par \par \par Patient name: NOLAN AQUINO\par YOB: 1940   Age: 82 (M)   MR#: [id number]\par Study Date: 4/13/2023\par Location: O/PSonographer: Serian Silva RD\par Study quality: Technically Fair\par Referring Physician: SANDIP WILLAMS NP\par Blood Pressure: 137/73 mmHg\par Height: 178 cm\par Weight: 76 kg\par BSA: 1.9 m2\par ------------------------------------------------------------------------\par PROCEDURE: Transthoracic echocardiogram with 2-D, M-Mode\par and complete spectral and color flow Doppler.\par Intravenous ultrasound enhancing agent was administered for\par improved left ventricular endocardial border definition. \par Following the intravenous injection of ultrasound enhancing\par agent, harmonic imaging was performed.\par INDICATION: Cardiomyopathy, unspecified (I42.9)\par ------------------------------------------------------------------------\par DIMENSIONS:\par Dimensions:     Normal Values:\par LA:     3.3 cm    2.0 - 4.0 cm\par Ao:     4.2 cm    2.0 - 3.8 cm\par SEPTUM: 0.7 cm    0.6 - 1.2 cm\par PWT:    0.7 cm    0.6 - 1.1 cm\par LVIDd:  6.3 cm    3.0 - 5.6 cm\par LVIDs:  5.6 cm    1.8 - 4.0 cm\par Derived Variables:\par LVMI: 89 g/m2\par RWT: 0.22\par Fractional short: 11 %\par Ejection Fraction (Modified Hernández Rule): 24 %\par ------------------------------------------------------------------------\par OBSERVATIONS:\par Mitral Valve: Mitral annular calcification, otherwise\par normal mitral valve. Minimal mitral regurgitation.\par Aortic Root: Mild aortic root dilatation  (Ao: 4.2 cm at\par the sinuses of Valsalva).\par Aortic Valve: Aortic valve leaflet morphology not well\par visualized.\par Left Atrium: Mildly dilated left atrium.  LA volume index =\par 41 cc/m2.\par Left Ventricle: Severe segmental left ventricular systolic\par dysfunction.  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  Hypokinesis of the\par septum and anterior wall.  Endocardial visualization\par enhanced with intravenous injection of echo contrast\par (Definity).  No LV thrombus seen. Mild left ventricular\par enlargement. Mild diastolic dysfunction (Stage I).\par Right Heart: Normal right atrium. The right ventricle is\par not well visualized; grossly normal right ventricular\par systolic function.  A device wire is noted in the right\par heart. Normal tricuspid valve.  Mild tricuspid\par regurgitation. Pulmonic valve not well visualized.\par Pericardium/PleuraNormal pericardium with no pericardial\par effusion.\par ------------------------------------------------------------------------\par CONCLUSIONS:\par 1. Mitral annular calcification, otherwise normal mitral\par valve. Minimal mitral regurgitation.\par 2. Mild aortic root dilatation  (Ao: 4.2 cm at the sinuses\par of Valsalva).\par 3. Mildly dilated left atrium.  LA volume index = 41 cc/m2.\par 4. Mild left ventricular enlargement.\par 5. Severe segmental left ventricular systolic dysfunction. 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  Hypokinesis of the septum and\par anterior wall.  Endocardial visualization enhanced with\par intravenous injection of echo contrast (Definity).  No LV\par thrombus seen.\par 6. Mild diastolic dysfunction (Stage I).\par 7. The right ventricle is not well visualized; grossly\par normal right ventricular systolic function.  A device wire\par is noted in the right heart.\par *** Compared with echocardiogram of 11/8/2018, no\par significant changes noted.\par ------------------------------------------------------------------------\stacy Confirmed on  4/13/2023 - 10:27:00 by Jason Flower M.D.,CHICO carvalho FACCpar ------------------------------------------------------------------------

## 2023-04-13 NOTE — CARDIOLOGY SUMMARY
[de-identified] : 4/13/23 Biventricular paced rhythm rate 55 bpm, NSST\par 12/12/22 Biventricular paced rhythm rate 52 bpm, NSST\par 2/24/22 Atrial paced 50 with biventricular paced rhythm, NSST\par 2/22/21 Atrial paced 50 with biventricular paced rhythm, NSST\par 8/17/20 Atrial paced with biventricular paced rhythm rate 50, NSST\par  \par  [de-identified] : 4/13/23 LVEF 24%, LVIDD 6.3 cm, mild LAE, severe segmental LV systolic dysfunction, no LV thrombus, mild diastolic dysfunction Stage 1, normal RV systolic function, mild TR , no significant change from 11/8/2018 \par \par Echo: TTE; 8/18/21 with LVEF 30-35%, \par \par TTE with Dr. Thorne on 9/14/2019 LVEF 40% with LVEDD 6 cm with moderate LV systolic dysfunction and mild Stage 1 diastolic dysfunction \par \par TTE 11/8/18 with no significant change with LVEF 26% normal LA, severe segmental LV systolic dysfunction, no LV thrombus, normal RV systolic function, mild to mod TR. No significant change from 1/17/14\par \par TTE 1/7/14 LVEF 25-30% severe LV dysfunction, no pulmonary hypertension, normal LA size, no mitral regurgitation \par

## 2023-04-13 NOTE — PHYSICAL EXAM
[Well Developed] : well developed [No Acute Distress] : no acute distress [Normal Conjunctiva] : normal conjunctiva [Clear Lung Fields] : clear lung fields [Good Air Entry] : good air entry [Soft] : abdomen soft [Non Tender] : non-tender [No Edema] : no edema [No Rash] : no rash [Normal] : alert and oriented, normal memory [Alert and Oriented] : alert and oriented [de-identified] : Elderly male  [de-identified] : JVP 8 cm  [de-identified] : kristian rate 50's, Left chest [employer] CRT-D, irregular/ regular [de-identified] : slow gait

## 2023-04-13 NOTE — DISCUSSION/SUMMARY
[Patient] : the patient [___ Month(s)] : in [unfilled] month(s) [FreeTextEntry1] : 1. Chronic systolic HF- NYHA class I symptoms. 4/13/23 TTE with LVEF 24%\par Appears euvolemic and normotensive \par - continue ramipril to 2.5 mg daily ( previously 5 mg secondary but decreased due to low B/P episodes)\par - continue jona 25 mg daily and recheck labs today including INR and will send to PCP\par -Continue with GDMT. Dosages not uptitrated due to prior hypotension at home \par - TTE 11/8/18 with LVEf 26% with no significant change from 1/17/14 and repeat at Dr. Cordova's (now PCP Dr. Camacho) 9/14/19 with improved LVEF 40% and repeat 8/18/21 with LVEF 30-35%, severe LV. systolic dysfunction, mild MR. TTE today with LVEF 24% \par - limit salt to less than 2000 mg per day\par - limit fluid to less than 2 liters per day\par - continue daily exercise\par \par 2. CAD S/P PCI- no active chest pain\par - continue Coreg at current dose of 12.5 mg bid, will not increase HR 55 bpm\par - pt will call if he has any change in symptoms and will do a NST\par \par 3. CKD- creat 2/8/19 was 1.42 to 1.6 on labs 8/2021\par - will check labs today and send to PCP and to hematologist Dr. Pineda [phone].\par \par 4. Hx stroke syndrome\par - pt is on Coumadin with INR in therapeutic range ( usually takes 6 mg daily alternating with 5 mg) \par \par \par Follow up in office in 4 months with device check , will call with labs\par \par  [EKG obtained to assist in diagnosis and management of assessed problem(s)] : EKG obtained to assist in diagnosis and management of assessed problem(s)

## 2023-04-13 NOTE — ASSESSMENT
[FreeTextEntry1] : 82 year old man with history of coronary artery disease, s/p PCI with resultant ischemic cardiomyopathy, HFrEF LVEF 25-30% , S/P BiV ICD 11/26/2010 and CKD. Last TTE 8/18/21 with LVEF 30-35%,  severe LV systolic dysfunction to LVEF 24% on TTE ( 4/13/23) ..S/P CRT-D generator change 8/1/22 ICD battery changed without issues. S/P right cataract surgery 9/26/22. Pt is here for a follow up today and device check after TTE.\par \par ACC/AHA Stage C, NYHA Class II\par Appears euvolemic and normotensive

## 2023-04-14 ENCOUNTER — NON-APPOINTMENT (OUTPATIENT)
Age: 83
End: 2023-04-14

## 2023-04-14 LAB
25(OH)D3 SERPL-MCNC: 64.9 NG/ML
ALBUMIN SERPL ELPH-MCNC: 4.7 G/DL
ALP BLD-CCNC: 76 U/L
ALT SERPL-CCNC: 25 U/L
ANION GAP SERPL CALC-SCNC: 15 MMOL/L
AST SERPL-CCNC: 28 U/L
BASOPHILS # BLD AUTO: 0.07 K/UL
BASOPHILS NFR BLD AUTO: 1.2 %
BILIRUB SERPL-MCNC: 0.7 MG/DL
BUN SERPL-MCNC: 38 MG/DL
CALCIUM SERPL-MCNC: 9.9 MG/DL
CHLORIDE SERPL-SCNC: 104 MMOL/L
CHOLEST SERPL-MCNC: 148 MG/DL
CO2 SERPL-SCNC: 22 MMOL/L
CREAT SERPL-MCNC: 1.68 MG/DL
EGFR: 41 ML/MIN/1.73M2
EOSINOPHIL # BLD AUTO: 0.22 K/UL
EOSINOPHIL NFR BLD AUTO: 3.9 %
ESTIMATED AVERAGE GLUCOSE: 114 MG/DL
HBA1C MFR BLD HPLC: 5.6 %
HCT VFR BLD CALC: 38.6 %
HDLC SERPL-MCNC: 58 MG/DL
HGB BLD-MCNC: 12.8 G/DL
IMM GRANULOCYTES NFR BLD AUTO: 0.2 %
INR PPP: 1.95 RATIO
LDLC SERPL CALC-MCNC: 58 MG/DL
LYMPHOCYTES # BLD AUTO: 0.92 K/UL
LYMPHOCYTES NFR BLD AUTO: 16.4 %
MAN DIFF?: NORMAL
MCHC RBC-ENTMCNC: 30.3 PG
MCHC RBC-ENTMCNC: 33.2 GM/DL
MCV RBC AUTO: 91.3 FL
MONOCYTES # BLD AUTO: 0.55 K/UL
MONOCYTES NFR BLD AUTO: 9.8 %
NEUTROPHILS # BLD AUTO: 3.84 K/UL
NEUTROPHILS NFR BLD AUTO: 68.5 %
NONHDLC SERPL-MCNC: 90 MG/DL
NT-PROBNP SERPL-MCNC: 684 PG/ML
PLATELET # BLD AUTO: 174 K/UL
POTASSIUM SERPL-SCNC: 4.6 MMOL/L
PROT SERPL-MCNC: 6.6 G/DL
PSA FREE FLD-MCNC: 19 %
PSA FREE SERPL-MCNC: 0.27 NG/ML
PSA SERPL-MCNC: 1.41 NG/ML
PT BLD: 23 SEC
RBC # BLD: 4.23 M/UL
RBC # FLD: 14.5 %
SODIUM SERPL-SCNC: 142 MMOL/L
TRIGL SERPL-MCNC: 160 MG/DL
TSH SERPL-ACNC: 2.95 UIU/ML
URATE SERPL-MCNC: 6.1 MG/DL
WBC # FLD AUTO: 5.61 K/UL

## 2023-08-17 NOTE — PHYSICAL EXAM
[Well Developed] : well developed [No Acute Distress] : no acute distress [Normal Conjunctiva] : normal conjunctiva [Clear Lung Fields] : clear lung fields [Good Air Entry] : good air entry [Soft] : abdomen soft [Non Tender] : non-tender [No Edema] : no edema [No Rash] : no rash [Normal] : alert and oriented, normal memory [Alert and Oriented] : alert and oriented [de-identified] : Elderly male  [de-identified] : JVP 8 cm  [de-identified] : kristian rate 50's, Left chest [employer] CRT-D, irregular/ regular [de-identified] : slow gait

## 2023-08-17 NOTE — HISTORY OF PRESENT ILLNESS
[FreeTextEntry1] : Nolan Aquino is an 82 year old man with history of coronary artery disease, s/p PCI with resultant ischemic cardiomyopathy, HFrEF LVEF 25-30% , S/P BiV ICD 11/26/2010 and CKD. Last TTE 8/18/21 with LVEF 30-35%,  severe LV systolic dysfunction to LVEF 24% on TTE ( 4/13/23) ..S/P CRT-D generator change 8/1/22 ICD battery changed without issues. S/P right cataract surgery 9/26/22. Pt is here for a follow up today.  Patient is here for a follow. Last visit 4/13/23,  he had a TTE   with no significant change from 11/8/2018 with LVEF 24%, LVIDD 6.3 cm, mild LAE, severe segmental LV systolic dysfunction, no LV thrombus, mild diastolic dysfunction Stage 1, normal RV systolic function, mild TR  He follows with Dr. Mckeon for his INRs with range 2-3 but 1.8 last visit and was treated with an additional dose.   SInce his last visit on 4/13/23, he has been doing well. His home weight increased to  172 from prior 167 lbs  that he attributes to eating more and has been approx 1/2-1 lb per week.  States he can walk approx 1/2 mile without dyspnea and climbs the stairs several times a day in his home without dyspnea. He sleeps with one pillow with no orthopnea. EKG today with biventricular paced rhythm at 53 bpm.  He is adhering to low salt diet and is drinking less than 2 liters of fluid per day.   He feels well with  no acute SOB, orthopnea, PND, CP, palpitations, dizziness ,syncope or ICD firing.    Pt had Covid 19 vaccine x 2 and booster without issue. He had the flu vaccine.    Patient name: NOLAN AQUINO YOB: 1940   Age: 82 (M)   MR#: [id number] Study Date: 4/13/2023 Location: O/PSonographer: Serina Silva RDCS Study quality: Technically Fair Referring Physician: SANDIP WILLAMS NP Blood Pressure: 137/73 mmHg Height: 178 cm Weight: 76 kg BSA: 1.9 m2 ------------------------------------------------------------------------ PROCEDURE: Transthoracic echocardiogram with 2-D, M-Mode and complete spectral and color flow Doppler. Intravenous ultrasound enhancing agent was administered for improved left ventricular endocardial border definition.  Following the intravenous injection of ultrasound enhancing agent, harmonic imaging was performed. INDICATION: Cardiomyopathy, unspecified (I42.9) ------------------------------------------------------------------------ DIMENSIONS: Dimensions:     Normal Values: LA:     3.3 cm    2.0 - 4.0 cm Ao:     4.2 cm    2.0 - 3.8 cm SEPTUM: 0.7 cm    0.6 - 1.2 cm PWT:    0.7 cm    0.6 - 1.1 cm LVIDd:  6.3 cm    3.0 - 5.6 cm LVIDs:  5.6 cm    1.8 - 4.0 cm Derived Variables: LVMI: 89 g/m2 RWT: 0.22 Fractional short: 11 % Ejection Fraction (Modified Hernández Rule): 24 % ------------------------------------------------------------------------ OBSERVATIONS: Mitral Valve: Mitral annular calcification, otherwise normal mitral valve. Minimal mitral regurgitation. Aortic Root: Mild aortic root dilatation  (Ao: 4.2 cm at the sinuses of Valsalva). Aortic Valve: Aortic valve leaflet morphology not well visualized. Left Atrium: Mildly dilated left atrium.  LA volume index = 41 cc/m2. Left Ventricle: Severe segmental left ventricular systolic dysfunction.  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  Hypokinesis of the septum and anterior wall.  Endocardial visualization enhanced with intravenous injection of echo contrast (Definity).  No LV thrombus seen. Mild left ventricular enlargement. Mild diastolic dysfunction (Stage I). Right Heart: Normal right atrium. The right ventricle is not well visualized; grossly normal right ventricular systolic function.  A device wire is noted in the right heart. Normal tricuspid valve.  Mild tricuspid regurgitation. Pulmonic valve not well visualized. Pericardium/PleuraNormal pericardium with no pericardial effusion. ------------------------------------------------------------------------ CONCLUSIONS: 1. Mitral annular calcification, otherwise normal mitral valve. Minimal mitral regurgitation. 2. Mild aortic root dilatation  (Ao: 4.2 cm at the sinuses of Valsalva). 3. Mildly dilated left atrium.  LA volume index = 41 cc/m2. 4. Mild left ventricular enlargement. 5. Severe segmental left ventricular systolic dysfunction.  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  Hypokinesis of the septum and anterior wall.  Endocardial visualization enhanced with intravenous injection of echo contrast (Definity).  No LV thrombus seen. 6. Mild diastolic dysfunction (Stage I). 7. The right ventricle is not well visualized; grossly normal right ventricular systolic function.  A device wire is noted in the right heart. *** Compared with echocardiogram of 11/8/2018, no significant changes noted. ------------------------------------------------------------------------ Confirmed on  4/13/2023 - 10:27:00 by Jason Flower M.D., [facility], [place] ------------------------------------------------------------------------

## 2023-08-17 NOTE — ASSESSMENT
[FreeTextEntry1] : 82 year old man with history of coronary artery disease, s/p PCI with resultant ischemic cardiomyopathy, HFrEF LVEF 25-30% , S/P BiV ICD 11/26/2010 and CKD. Last TTE 8/18/21 with LVEF 30-35%,  severe LV systolic dysfunction to LVEF 24% on TTE ( 4/13/23) ..S/P CRT-D generator change 8/1/22 ICD battery changed without issues. S/P right cataract surgery 9/26/22. Last TTE 4/2023 with no change with LVEF 24% and labs with K 4.6, BUN/creat 38/1.68 not requiring diuretics  ACC/AHA Stage C, NYHA Class II Appears euvolemic and normotensive with EKG with biventricular paced rhythm with VR 53 bpm with no dizziness/LH

## 2023-08-17 NOTE — CARDIOLOGY SUMMARY
[de-identified] : 8/17/23 Biventricular paced rhythm rate 53 bpm, NSST 4/13/23 Biventricular paced rhythm rate 55 bpm, NSST 12/12/22 Biventricular paced rhythm rate 52 bpm, NSST 2/24/22 Atrial paced 50 with biventricular paced rhythm, NSST 2/22/21 Atrial paced 50 with biventricular paced rhythm, NSST 8/17/20 Atrial paced with biventricular paced rhythm rate 50, NSST    [de-identified] : 4/13/23 LVEF 24%, LVIDD 6.3 cm, mild LAE, severe segmental LV systolic dysfunction, no LV thrombus, mild diastolic dysfunction Stage 1, normal RV systolic function, mild TR , no significant change from 11/8/2018 \par  \par  Echo: TTE; 8/18/21 with LVEF 30-35%, \par  \par  TTE with Dr. Thorne on 9/14/2019 LVEF 40% with LVEDD 6 cm with moderate LV systolic dysfunction and mild Stage 1 diastolic dysfunction \par  \par  TTE 11/8/18 with no significant change with LVEF 26% normal LA, severe segmental LV systolic dysfunction, no LV thrombus, normal RV systolic function, mild to mod TR. No significant change from 1/17/14\par  \par  TTE 1/7/14 LVEF 25-30% severe LV dysfunction, no pulmonary hypertension, normal LA size, no mitral regurgitation \par

## 2023-08-17 NOTE — DISCUSSION/SUMMARY
[Patient] : the patient [___ Month(s)] : in [unfilled] month(s) [FreeTextEntry1] : 1. Chronic systolic HF- NYHA class I symptoms. 4/13/23 TTE with LVEF 24% Appears euvolemic and normotensive  - continue ramipril to 2.5 mg daily ( previously 5 mg secondary but decreased due to low B/P episodes) - continue jona 25 mg daily , 4/2023 labs with K 4.6 and creat 1.68, serum pro .  -Continue with GDMT. - limit salt to less than 2000 mg per day - limit fluid to less than 2 liters per day - continue daily exercise  2. CAD S/P PCI- no active chest pain - continue Coreg at current dose of 12.5 mg bid, will not increase HR 53 bpm - pt will call if he has any change in symptoms and will do a NST  3. CKD- creat 2/8/19 was 1.42 to 1.6 on labs 8/2021 with repeat 4/2013 creat 1.68 - follows up hematologist Dr. Pineda [phone].  4. Hx stroke syndrome - pt is on Coumadin with INR in therapeutic range ( usually takes 6 mg daily alternating with 5 mg)    Follow up in office in 4 months with device check    [EKG obtained to assist in diagnosis and management of assessed problem(s)] : EKG obtained to assist in diagnosis and management of assessed problem(s)

## 2023-10-01 PROBLEM — Z45.02 IMPLANTABLE CARDIOVERTER-DEFIBRILLATOR (ICD) GENERATOR END OF LIFE: Status: ACTIVE | Noted: 2022-07-20

## 2024-01-01 ENCOUNTER — RX RENEWAL (OUTPATIENT)
Age: 84
End: 2024-01-01

## 2024-01-01 ENCOUNTER — APPOINTMENT (OUTPATIENT)
Dept: HEART FAILURE | Facility: CLINIC | Age: 84
End: 2024-01-01
Payer: MEDICARE

## 2024-01-01 ENCOUNTER — APPOINTMENT (OUTPATIENT)
Dept: ELECTROPHYSIOLOGY | Facility: CLINIC | Age: 84
End: 2024-01-01
Payer: MEDICARE

## 2024-01-01 ENCOUNTER — NON-APPOINTMENT (OUTPATIENT)
Age: 84
End: 2024-01-01

## 2024-01-01 ENCOUNTER — APPOINTMENT (OUTPATIENT)
Dept: HEART FAILURE | Facility: CLINIC | Age: 84
End: 2024-01-01

## 2024-01-01 VITALS
HEIGHT: 70 IN | WEIGHT: 174 LBS | OXYGEN SATURATION: 99 % | SYSTOLIC BLOOD PRESSURE: 118 MMHG | HEART RATE: 49 BPM | DIASTOLIC BLOOD PRESSURE: 72 MMHG | BODY MASS INDEX: 24.91 KG/M2

## 2024-01-01 DIAGNOSIS — I25.10 ATHEROSCLEROTIC HEART DISEASE OF NATIVE CORONARY ARTERY W/OUT ANGINA PECTORIS: ICD-10-CM

## 2024-01-01 DIAGNOSIS — Z95.810 PRESENCE OF AUTOMATIC (IMPLANTABLE) CARDIAC DEFIBRILLATOR: ICD-10-CM

## 2024-01-01 DIAGNOSIS — N18.30 CHRONIC KIDNEY DISEASE, STAGE 3 UNSPECIFIED: ICD-10-CM

## 2024-01-01 DIAGNOSIS — I42.9 CARDIOMYOPATHY, UNSPECIFIED: ICD-10-CM

## 2024-01-01 LAB
24R-OH-CALCIDIOL SERPL-MCNC: 20.6 PG/ML
ALBUMIN SERPL ELPH-MCNC: 4.5 G/DL
ALP BLD-CCNC: 84 U/L
ALT SERPL-CCNC: 30 U/L
ANION GAP SERPL CALC-SCNC: 13 MMOL/L
AST SERPL-CCNC: 33 U/L
BILIRUB SERPL-MCNC: 0.7 MG/DL
BUN SERPL-MCNC: 29 MG/DL
CALCIUM SERPL-MCNC: 9.9 MG/DL
CHLORIDE SERPL-SCNC: 106 MMOL/L
CHOLEST SERPL-MCNC: 141 MG/DL
CO2 SERPL-SCNC: 23 MMOL/L
CREAT SERPL-MCNC: 1.88 MG/DL
EGFR: 35 ML/MIN/1.73M2
ESTIMATED AVERAGE GLUCOSE: 114 MG/DL
HBA1C MFR BLD HPLC: 5.6 %
HCT VFR BLD CALC: 34.1 %
HDLC SERPL-MCNC: 55 MG/DL
HGB BLD-MCNC: 11.7 G/DL
LDLC SERPL CALC-MCNC: 61 MG/DL
MCHC RBC-ENTMCNC: 30.5 PG
MCHC RBC-ENTMCNC: 34.3 GM/DL
MCV RBC AUTO: 89 FL
NONHDLC SERPL-MCNC: 86 MG/DL
NT-PROBNP SERPL-MCNC: 963 PG/ML
PLATELET # BLD AUTO: 167 K/UL
POTASSIUM SERPL-SCNC: 5 MMOL/L
PROT SERPL-MCNC: 6.6 G/DL
PSA FREE FLD-MCNC: 22 %
PSA FREE SERPL-MCNC: 0.26 NG/ML
PSA SERPL-MCNC: 1.18 NG/ML
RBC # BLD: 3.83 M/UL
RBC # FLD: 14.6 %
SODIUM SERPL-SCNC: 142 MMOL/L
TRIGL SERPL-MCNC: 145 MG/DL
TSH SERPL-ACNC: 2.84 UIU/ML
URATE SERPL-MCNC: 6.4 MG/DL
VIT B12 SERPL-MCNC: >2000 PG/ML
WBC # FLD AUTO: 5.68 K/UL

## 2024-01-01 PROCEDURE — 36415 COLL VENOUS BLD VENIPUNCTURE: CPT

## 2024-01-01 PROCEDURE — 93000 ELECTROCARDIOGRAM COMPLETE: CPT

## 2024-01-01 PROCEDURE — 93284 PRGRMG EVAL IMPLANTABLE DFB: CPT

## 2024-01-01 PROCEDURE — 93295 DEV INTERROG REMOTE 1/2/MLT: CPT

## 2024-01-01 PROCEDURE — 93296 REM INTERROG EVL PM/IDS: CPT

## 2024-01-01 PROCEDURE — 99214 OFFICE O/P EST MOD 30 MIN: CPT

## 2024-01-01 RX ORDER — RAMIPRIL 2.5 MG/1
2.5 CAPSULE ORAL
Qty: 90 | Refills: 3 | Status: ACTIVE | COMMUNITY
Start: 2017-01-05 | End: 1900-01-01

## 2024-04-11 PROBLEM — Z95.810 BIVENTRICULAR ICD (IMPLANTABLE CARDIOVERTER-DEFIBRILLATOR) IN PLACE: Status: ACTIVE | Noted: 2022-07-20

## 2024-04-11 NOTE — ADDENDUM
[FreeTextEntry1] : Labs reviewed notable for mild decrease in H&H 11.7/34.1 from 12.8/38.6 and with K 5 and BUN/creat 29/1.88 from BUN/creat 28/1.68 with K 4,6. Will consider referral to nephrologist, will discuss with Dr. [place] and PCP Dr. Root. Will continue current meds. Will mail a copy of labs to patient to bring to PCP.

## 2024-04-11 NOTE — PHYSICAL EXAM
[Well Developed] : well developed [No Acute Distress] : no acute distress [Normal Conjunctiva] : normal conjunctiva [Normal Venous Pressure] : normal venous pressure [No Carotid Bruit] : no carotid bruit [Clear Lung Fields] : clear lung fields [Good Air Entry] : good air entry [Soft] : abdomen soft [Non Tender] : non-tender [No Edema] : no edema [No Rash] : no rash [Normal] : alert and oriented, normal memory [Alert and Oriented] : alert and oriented [de-identified] : Elderly male  [de-identified] : kristian rate 50's, left chest [employer] CRT-D, irregular/ regular

## 2024-04-11 NOTE — ASSESSMENT
[FreeTextEntry1] : 83 year old man with history of coronary artery disease, s/p PCI with resultant ischemic cardiomyopathy, HFrEF LVEF 25-30% , S/P BiV ICD 11/26/2010 and CKD. Last TTE 8/18/21 with LVEF 30-35%, severe LV systolic dysfunction to LVEF 24% on TTE ( 4/13/23)..S/P CRT-D generator change 8/1/22 ICD battery changed without issues. S/P right cataract surgery 9/26/22. Last TTE 4/2023 with no change with LVEF 24% and labs with K 4.6, BUN/creat 38/1.68 not requiring diuretics  ACC/AHA Stage C, NYHA Class II Appears euvolemic and normotensive with EKG with biventricular paced rhythm with VR 52 bpm with no dizziness/LH.

## 2024-04-11 NOTE — DISCUSSION/SUMMARY
[EKG obtained to assist in diagnosis and management of assessed problem(s)] : EKG obtained to assist in diagnosis and management of assessed problem(s) [FreeTextEntry1] : PAUL was instructed to follow-up in 4 month(s). 1. Chronic systolic HF- NYHA class I symptoms. 4/13/23 TTE with LVEF 24% Appears euvolemic and normotensive. - continue ramipril to 2.5 mg daily (previously 5 mg secondary but decreased due to low B/P episodes) - continue jona 25 mg daily, 4/2023 labs with K 4.6 and creat 1.68, serum pro , will recheck labs today and will call with results -Continue with GDMT. - limit salt to less than 2000 mg per day - limit fluid to less than 2 liters per day - continue daily exercise  2. CAD S/P PCI- no active chest pain - continue Coreg at current dose of 12.5 mg bid, will not increase HR 53 bpm - pt will call if he has any change in symptoms and will do a NST  3. CKD- creat 2/8/19 was 1.42 to 1.6 on labs 8/2021 with repeat 4/2013 creat 1.68 - follows up with hematologist Dr. Pineda [phone].  4. Hx stroke syndrome - pt is on Coumadin with INR in therapeutic range (usually takes 6 mg daily alternating with 5 mg)  Follow up in office in 4 months with device check.

## 2024-04-11 NOTE — CARDIOLOGY SUMMARY
[de-identified] : 4/11/24  AP intermittent with Biventricular paced rhythm rate 52 bpm, NSST 8/17/23 Biventricular paced rhythm rate 53 bpm, NSST 4/13/23 Biventricular paced rhythm rate 55 bpm, NSST 12/12/22 Biventricular paced rhythm rate 52 bpm, NSST 2/24/22 Atrial paced 50 with biventricular paced rhythm, NSST 2/22/21 Atrial paced 50 with biventricular paced rhythm, NSST 8/17/20 Atrial paced with biventricular paced rhythm rate 50, NSST    [de-identified] : 4/13/23 LVEF 24%, LVIDD 6.3 cm, mild LAE, severe segmental LV systolic dysfunction, no LV thrombus, mild diastolic dysfunction Stage 1, normal RV systolic function, mild TR , no significant change from 11/8/2018 \par  \par  Echo: TTE; 8/18/21 with LVEF 30-35%, \par  \par  TTE with Dr. Thorne on 9/14/2019 LVEF 40% with LVEDD 6 cm with moderate LV systolic dysfunction and mild Stage 1 diastolic dysfunction \par  \par  TTE 11/8/18 with no significant change with LVEF 26% normal LA, severe segmental LV systolic dysfunction, no LV thrombus, normal RV systolic function, mild to mod TR. No significant change from 1/17/14\par  \par  TTE 1/7/14 LVEF 25-30% severe LV dysfunction, no pulmonary hypertension, normal LA size, no mitral regurgitation \par

## 2024-04-11 NOTE — HISTORY OF PRESENT ILLNESS
[FreeTextEntry1] : Nolan Aquino is an 82-year-old man with history of coronary artery disease, s/p PCI with resultant ischemic cardiomyopathy, HFrEF LVEF 25-30% , S/P BiV ICD 11/26/2010, and CKD. Last TTE 8/18/21 with LVEF 30-35%, severe LV systolic dysfunction to LVEF 24% on TTE ( 4/13/23) ..S/P CRT-D generator change 8/1/22 ICD battery changed without issues. S/P right cataract surgery 9/26/22. Pt is here for a follow up today.  Patient is here for a follow. Last visit 12/7/23, he has been feeling well and has not had any hospitalizations for visits to urgent care. His last TTE was 4/13/23, he had a TTE   with no significant change from 11/8/2018 with LVEF 24%, LVIDD 6.3 cm, mild LAE, severe segmental LV systolic dysfunction, no LV thrombus, mild diastolic dysfunction Stage 1, normal RV systolic function, mild TR.  He follows with Dr. Root for his INRs with range 2-3, last 2.2. Last labs 4/2023 with no change with BUN/creat 38/1.68 with K 4.6.   States his home weight is stable in the range of 173 lbs. He has not been checking his B/P but is 118/72 today. States he can walk approx. 1/2 mile without dyspnea and climbs the stairs several times a day in his home without dyspnea. He sleeps with one pillow with no orthopnea. EKG today with biventricular paced rhythm at 52 bpm.  He is adhering to low salt diet and is drinking less than 2 liters of fluid per day.   He feels well, with no acute SOB, orthopnea, PND, CP, palpitations, dizziness, syncope or ICD firing. Device check today with AP 14% BiVP 99% and is set at DDD 50 and with brief PAF 2/25 for 21 seconds and is on coumadin.  Of note, reports his wife has the beginning stages of dementia and follows with neurology.  Pt had Covid 19 vaccine x 2 and booster without issue. He had the flu vaccine.   Patient name: NOLAN AQUINO YOB: 1940   Age: 82 (M)   MR#: [id number] Study Date: 4/13/2023 Location: O/PSonographer: Serina Silva [facility] Study quality: Technically Fair Referring Physician: SANDIP WILLAMS NP Blood Pressure: 137/73 mmHg Height: 178 cm Weight: 76 kg BSA: 1.9 m2 ------------------------------------------------------------------------ PROCEDURE: Transthoracic echocardiogram with 2-D, M-Mode and complete spectral and color flow Doppler. Intravenous ultrasound enhancing agent was administered for improved left ventricular endocardial border definition.  Following the intravenous injection of ultrasound enhancing agent, harmonic imaging was performed. INDICATION: Cardiomyopathy, unspecified (I42.9) ------------------------------------------------------------------------ DIMENSIONS: Dimensions:     Normal Values: LA:     3.3 cm    2.0 - 4.0 cm Ao:     4.2 cm    2.0 - 3.8 cm SEPTUM: 0.7 cm    0.6 - 1.2 cm PWT:    0.7 cm    0.6 - 1.1 cm LVIDd:  6.3 cm    3.0 - 5.6 cm LVIDs:  5.6 cm    1.8 - 4.0 cm Derived Variables: LVMI: 89 g/m2 RWT: 0.22 Fractional short: 11 % Ejection Fraction (Modified Hernández Rule): 24 % ------------------------------------------------------------------------ OBSERVATIONS: Mitral Valve: Mitral annular calcification, otherwise normal mitral valve. Minimal mitral regurgitation. Aortic Root: Mild aortic root dilatation  (Ao: 4.2 cm at the sinuses of Valsalva). Aortic Valve: Aortic valve leaflet morphology not well visualized. Left Atrium: Mildly dilated left atrium.  LA volume index = 41 cc/m2. Left Ventricle: Severe segmental left ventricular systolic dysfunction.  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  Hypokinesis of the septum and anterior wall.  Endocardial visualization enhanced with intravenous injection of echo contrast (Definity).  No LV thrombus seen. Mild left ventricular enlargement. Mild diastolic dysfunction (Stage I). Right Heart: Normal right atrium. The right ventricle is not well visualized; grossly normal right ventricular systolic function.  A device wire is noted in the right heart. Normal tricuspid valve.  Mild tricuspid regurgitation. Pulmonic valve not well visualized. Pericardium/PleuraNormal pericardium with no pericardial effusion. ------------------------------------------------------------------------ CONCLUSIONS: 1. Mitral annular calcification, otherwise normal mitral valve. Minimal mitral regurgitation. 2. Mild aortic root dilatation  (Ao: 4.2 cm at the sinuses of Valsalva). 3. Mildly dilated left atrium.  LA volume index = 41 cc/m2. 4. Mild left ventricular enlargement. 5. Severe segmental left ventricular systolic dysfunction.  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  Hypokinesis of the septum and anterior wall.  Endocardial visualization enhanced with intravenous injection of echo contrast (Definity).  No LV thrombus seen. 6. Mild diastolic dysfunction (Stage I). 7. The right ventricle is not well visualized; grossly normal right ventricular systolic function.  A device wire is noted in the right heart. *** Compared with echocardiogram of 11/8/2018, no significant changes noted. ------------------------------------------------------------------------ Confirmed on  4/13/2023 - 10:27:00 by Jason Flower M.D., [facility], [place] ------------------------------------------------------------------------

## 2024-07-11 ENCOUNTER — APPOINTMENT (OUTPATIENT)
Dept: ELECTROPHYSIOLOGY | Facility: CLINIC | Age: 84
End: 2024-07-11

## 2024-08-06 ENCOUNTER — APPOINTMENT (OUTPATIENT)
Dept: HEART FAILURE | Facility: CLINIC | Age: 84
End: 2024-08-06

## 2024-12-16 ENCOUNTER — APPOINTMENT (OUTPATIENT)
Dept: HEART FAILURE | Facility: CLINIC | Age: 84
End: 2024-12-16

## 2025-04-10 ENCOUNTER — APPOINTMENT (OUTPATIENT)
Dept: ELECTROPHYSIOLOGY | Facility: CLINIC | Age: 85
End: 2025-04-10

## 2025-04-10 ENCOUNTER — APPOINTMENT (OUTPATIENT)
Dept: HEART FAILURE | Facility: CLINIC | Age: 85
End: 2025-04-10